# Patient Record
Sex: MALE | Race: WHITE | Employment: PART TIME | URBAN - METROPOLITAN AREA
[De-identification: names, ages, dates, MRNs, and addresses within clinical notes are randomized per-mention and may not be internally consistent; named-entity substitution may affect disease eponyms.]

---

## 2017-01-16 ENCOUNTER — OFFICE VISIT (OUTPATIENT)
Dept: INTERNAL MEDICINE CLINIC | Facility: CLINIC | Age: 57
End: 2017-01-16

## 2017-01-16 VITALS
DIASTOLIC BLOOD PRESSURE: 82 MMHG | SYSTOLIC BLOOD PRESSURE: 136 MMHG | RESPIRATION RATE: 18 BRPM | TEMPERATURE: 98 F | BODY MASS INDEX: 22.69 KG/M2 | WEIGHT: 171.19 LBS | HEIGHT: 73 IN | HEART RATE: 86 BPM

## 2017-01-16 DIAGNOSIS — F25.9 SCHIZOAFFECTIVE DISORDER, UNSPECIFIED TYPE (HCC): ICD-10-CM

## 2017-01-16 DIAGNOSIS — N50.89 LUMP IN SCROTUM: ICD-10-CM

## 2017-01-16 DIAGNOSIS — N32.81 OAB (OVERACTIVE BLADDER): ICD-10-CM

## 2017-01-16 DIAGNOSIS — Z00.00 ENCOUNTER FOR ANNUAL HEALTH EXAMINATION: Primary | ICD-10-CM

## 2017-01-16 PROCEDURE — G0439 PPPS, SUBSEQ VISIT: HCPCS | Performed by: INTERNAL MEDICINE

## 2017-01-16 RX ORDER — QUETIAPINE 400 MG/1
TABLET, FILM COATED ORAL
Refills: 2 | COMMUNITY
Start: 2016-12-13 | End: 2017-02-16

## 2017-01-16 NOTE — PROGRESS NOTES
HPI:   Melvin Hodgkins is a 60year old male who presents for a Medicare Subsequent Annual Wellness visit (Pt already had Initial Annual Wellness). Patient is here requesting the Medicare annual wellness visit. Last seen here in June.   Had some dysuria Results  Component Value Date   WBC 5.5 06/28/2016   HGB 14.5 06/28/2016    06/28/2016        ALLERGIES:   He is allergic to lithium; haldol; haloperidol; and kdc:olanzapine.     CURRENT MEDICATIONS:     Outpatient Prescriptions Marked as Taking for over the telephone:  No I have trouble following   the conversations when two or more people are talking at the same time:    No   I have trouble understanding things on the TV:  No I have to strain to   understand conversations:  No   I have to worry abou JVD   Back:   Symmetric, no curvature, ROM normal, no CVA tenderness   Lungs:   Clear to auscultation bilaterally, respirations unlabored   Chest Wall:  No tenderness or deformity   Heart:  Regular rate and rhythm, S1, S2 normal, no murmur, rub or gallop out.        Mr. Lisa Ruelas does not currently take aspirin. We discussed the risks and benefits of aspirin therapy. Kate Posada is unable to use daily aspirin therapy For the following reasons:    Other: Not good candidate          Diet assessment: fair activities? : No    Memory Problems?: No      Fall/Risk Assessment     Do you have 3 or more medical conditions?: 0-No    Have you fallen in the last 12 months?: 0-No    Do you accidently lose urine?: 0-No    Do you have difficulty seeing?: 0-No    Do you Years due on 01/01/2019 Update Health Maintenance if applicable    Flex Sigmoidoscopy Screen every 10 years No results found for this or any previous visit. No flowsheet data found.      Fecal Occult Blood Annually No results found for: FOB No flowsheet madelyn

## 2017-01-16 NOTE — PATIENT INSTRUCTIONS
Roma Sands's SCREENING SCHEDULE   Tests on this list are recommended by your physician but may not be covered, or covered at this frequency, by your insurer. Please check with your insurance carrier before scheduling to verify coverage.     PREVENTATIV Covered every 5 years No results found for this or any previous visit. No flowsheet data found. Fecal Occult Blood   Covered Annually No results found for: FOB, OCCULTSTOOL No flowsheet data found.      Barium Enema-   uncomfortable but covered  Covere covered with your pharmacy  prescription benefits     Recommended Websites for Advanced Directives    [email or web address] [email or web address]/[email or web address]. pdf  An information packet, including necessary form from the [employer]

## 2017-02-06 ENCOUNTER — HOSPITAL (OUTPATIENT)
Dept: OTHER | Age: 57
End: 2017-02-06
Attending: EMERGENCY MEDICINE

## 2017-02-07 LAB
AMPHETAMINES UR QL SCN>500 NG/ML: NEGATIVE
ANALYZER ANC (IANC): NORMAL
ANION GAP SERPL CALC-SCNC: 11 MMOL/L (ref 10–20)
BARBITURATES UR QL SCN>200 NG/ML: NEGATIVE
BASOPHILS # BLD: 0 THOUSAND/MCL (ref 0–0.3)
BASOPHILS NFR BLD: 0 %
BENZODIAZ UR QL SCN>200 NG/ML: NEGATIVE
BUN SERPL-MCNC: 9 MG/DL (ref 10–20)
BUN/CREAT SERPL: 11 (ref 7–25)
BZE UR QL SCN>150 NG/ML: NEGATIVE
CALCIUM SERPL-MCNC: 9.2 MG/DL (ref 8.4–10.2)
CANNABINOIDS UR QL SCN>50 NG/ML: NEGATIVE
CHLORIDE: 105 MMOL/L (ref 98–107)
CO2 SERPL-SCNC: 29 MMOL/L (ref 21–32)
CREAT SERPL-MCNC: 0.79 MG/DL (ref 0.67–1.17)
DIFFERENTIAL METHOD BLD: NORMAL
EOSINOPHIL # BLD: 0.2 THOUSAND/MCL (ref 0.1–0.5)
EOSINOPHIL NFR BLD: 3 %
ERYTHROCYTE [DISTWIDTH] IN BLOOD: 12.2 % (ref 11–15)
ETHANOL SERPL-MCNC: NORMAL MG/DL
GLUCOSE SERPL-MCNC: 124 MG/DL (ref 65–99)
HEMATOCRIT: 41.3 % (ref 39–51)
HGB BLD-MCNC: 14.6 GM/DL (ref 13–17)
LYMPHOCYTES # BLD: 2.5 THOUSAND/MCL (ref 1–4)
LYMPHOCYTES NFR BLD: 32 %
MCH RBC QN AUTO: 30.4 PG (ref 26–34)
MCHC RBC AUTO-ENTMCNC: 35.4 GM/DL (ref 32–36.5)
MCV RBC AUTO: 86 FL (ref 78–100)
MONOCYTES # BLD: 0.8 THOUSAND/MCL (ref 0.3–0.9)
MONOCYTES NFR BLD: 10 %
NEUTROPHILS # BLD: 4.2 THOUSAND/MCL (ref 1.8–7.7)
NEUTROPHILS NFR BLD: 55 %
NEUTS SEG NFR BLD: NORMAL %
OPIATES UR QL SCN>300 NG/ML: NEGATIVE
PCP UR QL SCN>25 NG/ML: NEGATIVE
PERCENT NRBC: NORMAL
PLATELET # BLD: 292 THOUSAND/MCL (ref 140–450)
POTASSIUM SERPL-SCNC: 3.9 MMOL/L (ref 3.4–5.1)
RBC # BLD: 4.8 MILLION/MCL (ref 4.5–5.9)
SODIUM SERPL-SCNC: 141 MMOL/L (ref 135–145)
WBC # BLD: 7.7 THOUSAND/MCL (ref 4.2–11)

## 2017-02-16 NOTE — PROGRESS NOTES
HPI:    Patient ID: Rowan Stone is a 60year old male. HPI  Patient is here for follow-up on recent emergency room visit. We last saw him here on January 16 for Medicare annual wellness visit. Underlying schizoaffective disorder.   Patient feels he i [Haloperidol*      Haloperidol                 Comment:Other reaction(s): HALOPERIDOL LACTATE             Other reaction(s): HALOPERIDOL  Kdc:Olanzapine            Lithium                   Valproic Acid              PHYSICAL EXAM:   Physical Exam    Const

## 2017-03-30 ENCOUNTER — OFFICE VISIT (OUTPATIENT)
Dept: INTERNAL MEDICINE CLINIC | Facility: CLINIC | Age: 57
End: 2017-03-30

## 2017-03-30 ENCOUNTER — HOSPITAL ENCOUNTER (OUTPATIENT)
Dept: GENERAL RADIOLOGY | Age: 57
Discharge: HOME | End: 2017-03-30
Attending: INTERNAL MEDICINE | Admitting: INTERNAL MEDICINE
Payer: MEDICARE

## 2017-03-30 VITALS
SYSTOLIC BLOOD PRESSURE: 122 MMHG | DIASTOLIC BLOOD PRESSURE: 78 MMHG | WEIGHT: 160 LBS | RESPIRATION RATE: 18 BRPM | HEART RATE: 74 BPM | TEMPERATURE: 98 F | BODY MASS INDEX: 21.2 KG/M2 | HEIGHT: 73 IN

## 2017-03-30 DIAGNOSIS — Z87.828 HISTORY OF TRAUMATIC INJURY OF HEAD: Primary | ICD-10-CM

## 2017-03-30 DIAGNOSIS — Z87.828 HISTORY OF TRAUMATIC INJURY OF HEAD: ICD-10-CM

## 2017-03-30 PROCEDURE — G0463 HOSPITAL OUTPT CLINIC VISIT: HCPCS | Performed by: INTERNAL MEDICINE

## 2017-03-30 PROCEDURE — 70250 X-RAY EXAM OF SKULL: CPT

## 2017-03-30 PROCEDURE — 99213 OFFICE O/P EST LOW 20 MIN: CPT | Performed by: INTERNAL MEDICINE

## 2017-03-30 RX ORDER — LORAZEPAM 1 MG/1
TABLET ORAL
Refills: 1 | COMMUNITY
Start: 2017-03-25 | End: 2018-07-13 | Stop reason: ALTCHOICE

## 2017-03-30 RX ORDER — QUETIAPINE 400 MG/1
TABLET, FILM COATED ORAL
Refills: 2 | COMMUNITY
Start: 2017-02-21 | End: 2017-03-30 | Stop reason: ALTCHOICE

## 2017-03-30 RX ORDER — LURASIDONE HYDROCHLORIDE 20 MG/1
TABLET, FILM COATED ORAL
Refills: 2 | COMMUNITY
Start: 2017-03-25 | End: 2018-03-16 | Stop reason: ALTCHOICE

## 2017-03-30 NOTE — PROGRESS NOTES
HPI:    Patient ID: Melvin Hodgkins is a 59year old male. HPI  Patient is here concerned about possible issues with his skull and brain from previous injuries. He has underlying schizoaffective disorder. Saw him here on February 16.   At that time he walker reaction(s): HALOPERIDOL LACTATE             Other reaction(s): HALOPERIDOL  Kdc:Olanzapine            Lithium                   Valproic Acid              PHYSICAL EXAM:   Physical Exam    Constitutional: He appears well-developed and well-nourished.    HE

## 2017-07-28 ENCOUNTER — HOSPITAL ENCOUNTER (EMERGENCY)
Facility: HOSPITAL | Age: 57
Discharge: HOME | End: 2017-07-28
Attending: EMERGENCY MEDICINE
Payer: MEDICARE

## 2017-07-28 ENCOUNTER — APPOINTMENT (OUTPATIENT)
Dept: GENERAL RADIOLOGY | Facility: HOSPITAL | Age: 57
End: 2017-07-28
Attending: EMERGENCY MEDICINE
Payer: MEDICARE

## 2017-07-28 VITALS
TEMPERATURE: 98 F | RESPIRATION RATE: 16 BRPM | WEIGHT: 160 LBS | HEART RATE: 50 BPM | SYSTOLIC BLOOD PRESSURE: 110 MMHG | OXYGEN SATURATION: 97 % | BODY MASS INDEX: 21.2 KG/M2 | DIASTOLIC BLOOD PRESSURE: 70 MMHG | HEIGHT: 73 IN

## 2017-07-28 DIAGNOSIS — S61.217A LACERATION OF LEFT LITTLE FINGER WITHOUT FOREIGN BODY WITHOUT DAMAGE TO NAIL, INITIAL ENCOUNTER: Primary | ICD-10-CM

## 2017-07-28 PROCEDURE — 73140 X-RAY EXAM OF FINGER(S): CPT | Performed by: EMERGENCY MEDICINE

## 2017-07-28 PROCEDURE — 12002 RPR S/N/AX/GEN/TRNK2.6-7.5CM: CPT

## 2017-07-28 PROCEDURE — 90471 IMMUNIZATION ADMIN: CPT

## 2017-07-28 PROCEDURE — 99283 EMERGENCY DEPT VISIT LOW MDM: CPT

## 2017-07-28 RX ORDER — TETANUS AND DIPHTHERIA TOXOIDS ADSORBED 2; 2 [LF]/.5ML; [LF]/.5ML
INJECTION INTRAMUSCULAR
Status: DISCONTINUED
Start: 2017-07-28 | End: 2017-07-28

## 2017-07-28 NOTE — ED INITIAL ASSESSMENT (HPI)
L 5th finger laceration while using  at work just prior to arrival to ER, bleeding controlled. PETER Zelaya providing wound care.

## 2017-07-28 NOTE — ED PROVIDER NOTES
Patient Seen in: [facility] AND [facility] Emergency Department    History   Patient presents with:  Laceration Abrasion (integumentary)    Stated Complaint: L pinky lac    HPI    HPI: Orlin Pedroza is a 59year old male who presents after an injury to the l 5th distal prox phalynx to prox distal phalynx 5 cm in length, no tendong injury, rom preserved sensation intact  . 2+ distal pulses. NEURO:Sensation to touch is intact. SKIN: No open wounds, no rashes. PSYCH: Normal affect. Calm and cooperative.

## 2017-07-29 ENCOUNTER — TELEPHONE (OUTPATIENT)
Dept: INTERNAL MEDICINE CLINIC | Facility: CLINIC | Age: 57
End: 2017-07-29

## 2017-07-29 NOTE — TELEPHONE ENCOUNTER
Pt states went to ER and got stitches in finger and per the instruction the Pt is to see his PCP in 7 days to get stitches removed. Pt is requesting to be seen 8/3/2017 (this will be the 7th day) to get stitches removed but no open slots.

## 2017-08-02 NOTE — TELEPHONE ENCOUNTER
Okay to place at 1:00 or 130 this Friday afternoon. Otherwise he can be placed in a same day sick next Monday.

## 2017-08-02 NOTE — TELEPHONE ENCOUNTER
Pt is calling back state that he would like to wait two more days to make sure it heal   Requesting a different date   Dr please advise

## 2017-08-07 ENCOUNTER — OFFICE VISIT (OUTPATIENT)
Dept: INTERNAL MEDICINE CLINIC | Facility: CLINIC | Age: 57
End: 2017-08-07

## 2017-08-07 VITALS
BODY MASS INDEX: 21 KG/M2 | HEART RATE: 60 BPM | TEMPERATURE: 98 F | DIASTOLIC BLOOD PRESSURE: 64 MMHG | WEIGHT: 159 LBS | SYSTOLIC BLOOD PRESSURE: 109 MMHG

## 2017-08-07 DIAGNOSIS — S61.211S LACERATION OF LEFT INDEX FINGER WITHOUT FOREIGN BODY WITHOUT DAMAGE TO NAIL, SEQUELA: Primary | ICD-10-CM

## 2017-08-07 PROCEDURE — G0463 HOSPITAL OUTPT CLINIC VISIT: HCPCS | Performed by: INTERNAL MEDICINE

## 2017-08-07 PROCEDURE — 99212 OFFICE O/P EST SF 10 MIN: CPT | Performed by: INTERNAL MEDICINE

## 2017-08-08 NOTE — PROGRESS NOTES
HPI:    Patient ID: Merrill Hernandes is a 59year old male.     HPI  10 days ago suffered a laceration of the left index finger, 8 sutures were placed in the emergency room, he reports no pain, states that he works with his hands, and was protecting laceration encounter diagnosis)  Tolerated removal of sutures well, Steri-Strips applied, protect area from injury for another few days, follow-up as needed  No orders of the defined types were placed in this encounter.       Meds This Visit:  No prescriptions request

## 2018-03-07 ENCOUNTER — TELEPHONE (OUTPATIENT)
Dept: INTERNAL MEDICINE CLINIC | Facility: CLINIC | Age: 58
End: 2018-03-07

## 2018-03-07 NOTE — TELEPHONE ENCOUNTER
The issues that would limit the patient's safety and driving have to do with his underlying psychiatric conditions and the medications that he is taking. Therefore, any form would need to be completed by a psychiatrist in regards to clearance for driving.

## 2018-03-07 NOTE — TELEPHONE ENCOUNTER
Please note reason for call and advise,  drivers service Dept. Medical report form placed on JSK desk for review and signature.

## 2018-03-07 NOTE — TELEPHONE ENCOUNTER
Pt dropped off a Sec of state from that needs to be completed ASAP. Pt would like to be called when form is done.  Form was put in Dr Smitha Freeman box at [facility]

## 2018-03-09 NOTE — TELEPHONE ENCOUNTER
LEFT MESSAGE FOR PATIENT TO CALL OFFICE BACK, TRANSFER CALL TO EXT. [facility] PLEASE NOTE PHYSICIAN NOTE CONCERNING FORM. FORM PLACED AT  [facility] 2ND FLOOR.

## 2018-03-14 NOTE — TELEPHONE ENCOUNTER
LMTCB please let pt know his appt with Mari Harvey has been cancelled for 3-16-18, Pt presented to [facility] with a form he needs filled out ASAP and wanted to make appt, appt with Mari Harvey was booked, but then cancelled when this encounter from last week was read, p

## 2018-03-14 NOTE — TELEPHONE ENCOUNTER
Spoke to pt, he is aware his appt with Rufina Dozierr is cancelled for 3-16-18, he is awaiting a nurse call regarding why his form cannot be filled out

## 2018-03-14 NOTE — TELEPHONE ENCOUNTER
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  Patient informed of Dr Stock He message (view below), he states he seen the psychiatrist and he filled out the mental health sect

## 2018-03-16 ENCOUNTER — OFFICE VISIT (OUTPATIENT)
Dept: INTERNAL MEDICINE CLINIC | Facility: CLINIC | Age: 58
End: 2018-03-16

## 2018-03-16 VITALS
DIASTOLIC BLOOD PRESSURE: 69 MMHG | BODY MASS INDEX: 23.06 KG/M2 | HEIGHT: 73 IN | HEART RATE: 67 BPM | SYSTOLIC BLOOD PRESSURE: 121 MMHG | WEIGHT: 174 LBS

## 2018-03-16 DIAGNOSIS — Z02.89 ENCOUNTER FOR COMPLETION OF FORM WITH PATIENT: Primary | ICD-10-CM

## 2018-03-16 PROCEDURE — G0463 HOSPITAL OUTPT CLINIC VISIT: HCPCS | Performed by: INTERNAL MEDICINE

## 2018-03-16 PROCEDURE — 99213 OFFICE O/P EST LOW 20 MIN: CPT | Performed by: INTERNAL MEDICINE

## 2018-03-16 RX ORDER — QUETIAPINE 300 MG/1
TABLET, FILM COATED ORAL
Refills: 2 | COMMUNITY
Start: 2018-03-02

## 2018-03-16 NOTE — PROGRESS NOTES
Marie Saba is a 59year old male. Patient presents with:  Complete Form    HPI:   Mr. Alesia Hartley presents this morning requesting that a form from the  be completed.   He says he needs this form completed every 4-5 years documenting that it distress  /69 (BP Location: Left arm, Patient Position: Sitting, Cuff Size: large)   Pulse 67   Ht 6' 1\" (1.854 m)   Wt 174 lb (78.9 kg)   BMI 22.96 kg/m²   Exam deferred      ASSESSMENT AND PLAN:   1.  Encounter for completion of form with patient

## 2018-06-22 ENCOUNTER — HOSPITAL ENCOUNTER (EMERGENCY)
Facility: HOSPITAL | Age: 58
Discharge: HOME | End: 2018-06-22
Attending: EMERGENCY MEDICINE
Payer: MEDICARE

## 2018-06-22 ENCOUNTER — TELEPHONE (OUTPATIENT)
Dept: GASTROENTEROLOGY | Facility: CLINIC | Age: 58
End: 2018-06-22

## 2018-06-22 ENCOUNTER — APPOINTMENT (OUTPATIENT)
Dept: CT IMAGING | Facility: HOSPITAL | Age: 58
End: 2018-06-22
Attending: EMERGENCY MEDICINE
Payer: MEDICARE

## 2018-06-22 VITALS
HEART RATE: 54 BPM | WEIGHT: 160 LBS | DIASTOLIC BLOOD PRESSURE: 74 MMHG | TEMPERATURE: 98 F | BODY MASS INDEX: 21.2 KG/M2 | HEIGHT: 73 IN | SYSTOLIC BLOOD PRESSURE: 119 MMHG | RESPIRATION RATE: 18 BRPM | OXYGEN SATURATION: 98 %

## 2018-06-22 DIAGNOSIS — R53.81 MALAISE: ICD-10-CM

## 2018-06-22 DIAGNOSIS — R10.9 ABDOMINAL PAIN, ACUTE: Primary | ICD-10-CM

## 2018-06-22 PROCEDURE — 85025 COMPLETE CBC W/AUTO DIFF WBC: CPT | Performed by: EMERGENCY MEDICINE

## 2018-06-22 PROCEDURE — 82962 GLUCOSE BLOOD TEST: CPT

## 2018-06-22 PROCEDURE — 93005 ELECTROCARDIOGRAM TRACING: CPT

## 2018-06-22 PROCEDURE — 93010 ELECTROCARDIOGRAM REPORT: CPT | Performed by: EMERGENCY MEDICINE

## 2018-06-22 PROCEDURE — 80048 BASIC METABOLIC PNL TOTAL CA: CPT | Performed by: EMERGENCY MEDICINE

## 2018-06-22 PROCEDURE — 74177 CT ABD & PELVIS W/CONTRAST: CPT | Performed by: EMERGENCY MEDICINE

## 2018-06-22 PROCEDURE — 36415 COLL VENOUS BLD VENIPUNCTURE: CPT

## 2018-06-22 PROCEDURE — 99285 EMERGENCY DEPT VISIT HI MDM: CPT

## 2018-06-22 RX ORDER — METRONIDAZOLE 500 MG/1
500 TABLET ORAL 3 TIMES DAILY
Qty: 21 TABLET | Refills: 0 | Status: SHIPPED | OUTPATIENT
Start: 2018-06-22 | End: 2018-06-29

## 2018-06-22 RX ORDER — LEVOFLOXACIN 500 MG/1
500 TABLET, FILM COATED ORAL DAILY
Qty: 7 TABLET | Refills: 0 | Status: SHIPPED | OUTPATIENT
Start: 2018-06-22 | End: 2018-06-29

## 2018-06-22 NOTE — TELEPHONE ENCOUNTER
D/w ER--->patient with some mild ab pain; no fever or leukocytosis. CT showing ileitis, infectious vs Crohn's. He is being discharged on oral abx and some pain meds. GI Clinical Staff:   Please call patient, I would like to see him in clinic for f/u.  Do

## 2018-06-22 NOTE — TELEPHONE ENCOUNTER
LMTCB    CSS-please offer appt with Dr. Nigel Reed at [facility] on 6/29/18 @ 2:30pm, arrival 2:15pm AND re-route to RN's to add the pt to the provider schedule, thank you.

## 2018-06-22 NOTE — ED PROVIDER NOTES
Patient Seen in: [facility] AND [facility] Emergency Department    History   Patient presents with:  Fatigue (constitutional, neurologic)    Stated Complaint: possible hyperglycemia    HPI    Patient presents to the emergency department with complaint of multipl Review of Systems  Constitutional: no fever, feels like his energy is diminished he feels like he is losing weight  HEENT: No cough, no congestion  Cardiovascular: no chest pain  Respiratory: no shortness of breath  Gastrointestinal: abdominal pain, some inflammation. Vital signs are reassuring he has no known intestinal issues. I have paged gastroenterologist to discuss. Patient workup shows regional enteritis. I discussed this with him I did discuss with gastroenterologist Dr. Hayden Tapia.   He did r obtain basic health screening including reassessment of your blood pressure.       Clinical Impression:  Abdominal pain, acute  (primary encounter diagnosis)  Malaise    Disposition:  Discharge    Follow-up:  Tasha Feliz MD  [address]

## 2018-06-22 NOTE — ED INITIAL ASSESSMENT (HPI)
Pt  States he feels like he's going to faint and has had a decreased appetite intermittently [id number] with LLQ pain. Pt denies CP, sob, or fevers.

## 2018-06-28 NOTE — TELEPHONE ENCOUNTER
3rd attempt to reach pt regarding Dr. Maria Alejandra Mckeon message below. LMTCB, no answer. No response letter generated and mailed to pt home address today.

## 2018-07-13 ENCOUNTER — OFFICE VISIT (OUTPATIENT)
Dept: FAMILY MEDICINE CLINIC | Facility: CLINIC | Age: 58
End: 2018-07-13

## 2018-07-13 ENCOUNTER — APPOINTMENT (OUTPATIENT)
Dept: LAB | Age: 58
End: 2018-07-13
Attending: FAMILY MEDICINE
Payer: MEDICARE

## 2018-07-13 VITALS
WEIGHT: 156.88 LBS | TEMPERATURE: 99 F | HEIGHT: 73 IN | BODY MASS INDEX: 20.79 KG/M2 | DIASTOLIC BLOOD PRESSURE: 68 MMHG | SYSTOLIC BLOOD PRESSURE: 109 MMHG | HEART RATE: 64 BPM

## 2018-07-13 DIAGNOSIS — R91.1 PULMONARY NODULE: ICD-10-CM

## 2018-07-13 DIAGNOSIS — K52.9 COLITIS: Primary | ICD-10-CM

## 2018-07-13 DIAGNOSIS — R63.4 WEIGHT LOSS: ICD-10-CM

## 2018-07-13 LAB — TSH SERPL-ACNC: 1.05 UIU/ML (ref 0.45–5.33)

## 2018-07-13 PROCEDURE — G0463 HOSPITAL OUTPT CLINIC VISIT: HCPCS | Performed by: FAMILY MEDICINE

## 2018-07-13 PROCEDURE — 84443 ASSAY THYROID STIM HORMONE: CPT

## 2018-07-13 PROCEDURE — 99213 OFFICE O/P EST LOW 20 MIN: CPT | Performed by: FAMILY MEDICINE

## 2018-07-13 PROCEDURE — 36415 COLL VENOUS BLD VENIPUNCTURE: CPT

## 2018-07-13 RX ORDER — QUETIAPINE 200 MG/1
TABLET, FILM COATED ORAL
Refills: 2 | COMMUNITY
Start: 2018-07-03

## 2018-07-13 RX ORDER — LEVOFLOXACIN 500 MG/1
TABLET, FILM COATED ORAL
Refills: 0 | COMMUNITY
Start: 2018-06-22 | End: 2018-07-13 | Stop reason: ALTCHOICE

## 2018-07-13 RX ORDER — METRONIDAZOLE 500 MG/1
500 TABLET ORAL 3 TIMES DAILY
Refills: 0 | COMMUNITY
Start: 2018-06-22 | End: 2018-07-13 | Stop reason: ALTCHOICE

## 2018-07-13 NOTE — PROGRESS NOTES
Blood pressure 109/68, pulse 64, temperature 98.6 °F (37 °C), temperature source Oral, height 6' 1\" (1.854 m), weight 156 lb 14.4 oz (71.2 kg).     Patient presents today following up for an emergency department visit where he was found to have colitis and

## 2018-07-26 ENCOUNTER — HOSPITAL ENCOUNTER (OUTPATIENT)
Dept: CT IMAGING | Facility: HOSPITAL | Age: 58
Discharge: HOME | End: 2018-07-26
Attending: FAMILY MEDICINE
Payer: MEDICARE

## 2018-07-26 DIAGNOSIS — R91.1 PULMONARY NODULE: ICD-10-CM

## 2018-07-26 PROCEDURE — 71250 CT THORAX DX C-: CPT | Performed by: FAMILY MEDICINE

## 2018-08-02 ENCOUNTER — TELEPHONE (OUTPATIENT)
Dept: GASTROENTEROLOGY | Facility: CLINIC | Age: 58
End: 2018-08-02

## 2018-08-02 NOTE — TELEPHONE ENCOUNTER
Pt had consult today came to [facility] in error- resched per MD- per Eli Gan- RN to call pt to reschedule before next available-pl call pt

## 2018-08-02 NOTE — TELEPHONE ENCOUNTER
Discussed w/ BILL and OK to add pt on 8/7/18 @ 11:45AM, arrival of 11:30AM at the [address] [place] [address]. LMTCB- CSS- please confirm appt and re-route to RN's to add pt to provider schedule, thanks.

## 2018-08-02 NOTE — TELEPHONE ENCOUNTER
RICHAR - Pt ret'd call. He needs to check with his employer about this appt. He will call back tomorrow before noon to confirm appt. He did not want CSS to add appt to schedule until he calls back.

## 2018-08-02 NOTE — TELEPHONE ENCOUNTER
Checo Grayson MD routed conversation to [facility] Clinical Staff 23 minutes ago (1:07 PM)      Checo Grayson MD 24 minutes ago (1:06 PM)         See if patient can come at 11:45 on Tuesday August 7th.     Thanks     Jessie Zaidi MD  [facility]

## 2018-08-06 NOTE — TELEPHONE ENCOUNTER
LMTCB-    CSS-does pt want the appt for 8/7/18 @ 11:45AM (arrive at 11:30AM) w/  [address] [id number] [place] [facility]?

## 2018-08-07 NOTE — TELEPHONE ENCOUNTER
Pt states he can NOT make the [phone] appointment because he works at noon attempt to transfer to RN with NO ANSWER please call thank you

## 2018-08-08 NOTE — TELEPHONE ENCOUNTER
Dr Abril Suárez,    Pt can only come in the morning. You have a very busy schedule tomorrow, but would you like to add at 9 am. Pt works 12-5.     Please advise

## 2018-08-08 NOTE — TELEPHONE ENCOUNTER
I can see him at 9 AM on Tuesday 8/14    Thanks    Jessie Zaidi MD  [address] - Gastroenterology  8/8/2018  10:44 AM

## 2018-08-08 NOTE — TELEPHONE ENCOUNTER
I added the pt to Dr Saima Levin schedule.     Future Appointments  Date Time Provider Yuval Coles   8/14/2018 9:00 AM Florence Staples MD [facility] [place]   8/15/2018 10:00 AM Darrius Workman DO [facility] [place]     CSS/NANCY:    If pt c

## 2018-08-09 NOTE — TELEPHONE ENCOUNTER
ODILIA    CSS- please confirm pt appt for 8/14/18 @ 9AM w/ Dr. Ivon Andrews (arrival time of 8:45AM), thank you.

## 2018-08-10 NOTE — TELEPHONE ENCOUNTER
Noted, thank you Zana Brannon.   Future Appointments  Date Time Provider Yuval Coles   8/14/2018 9:00 AM Ariane Medrano MD [facility]

## 2018-08-10 NOTE — TELEPHONE ENCOUNTER
FYI - Pt returned call and accepted appt for 8/14/18 at [address] [place] [address] w/ arrival at 8:45 at [facility].

## 2018-08-14 ENCOUNTER — OFFICE VISIT (OUTPATIENT)
Dept: GASTROENTEROLOGY | Facility: CLINIC | Age: 58
End: 2018-08-14
Payer: MEDICARE

## 2018-08-14 ENCOUNTER — APPOINTMENT (OUTPATIENT)
Dept: LAB | Facility: HOSPITAL | Age: 58
End: 2018-08-14
Attending: INTERNAL MEDICINE
Payer: MEDICARE

## 2018-08-14 VITALS
DIASTOLIC BLOOD PRESSURE: 66 MMHG | HEIGHT: 72 IN | HEART RATE: 80 BPM | SYSTOLIC BLOOD PRESSURE: 101 MMHG | WEIGHT: 153 LBS | BODY MASS INDEX: 20.72 KG/M2

## 2018-08-14 DIAGNOSIS — R10.32 LEFT LOWER QUADRANT PAIN: ICD-10-CM

## 2018-08-14 DIAGNOSIS — R93.5 ABNORMAL CT OF THE ABDOMEN: ICD-10-CM

## 2018-08-14 DIAGNOSIS — R19.7 DIARRHEA, UNSPECIFIED TYPE: ICD-10-CM

## 2018-08-14 DIAGNOSIS — R19.7 DIARRHEA, UNSPECIFIED TYPE: Primary | ICD-10-CM

## 2018-08-14 LAB
ALBUMIN SERPL BCP-MCNC: 4.3 G/DL (ref 3.5–4.8)
ALBUMIN/GLOB SERPL: 1.6 {RATIO} (ref 1–2)
ALP SERPL-CCNC: 74 U/L (ref 32–100)
ALT SERPL-CCNC: 21 U/L (ref 17–63)
ANION GAP SERPL CALC-SCNC: 8 MMOL/L (ref 0–18)
AST SERPL-CCNC: 22 U/L (ref 15–41)
BILIRUB SERPL-MCNC: 0.4 MG/DL (ref 0.3–1.2)
BUN SERPL-MCNC: 11 MG/DL (ref 8–20)
BUN/CREAT SERPL: 11.1 (ref 10–20)
CALCIUM SERPL-MCNC: 9.4 MG/DL (ref 8.5–10.5)
CHLORIDE SERPL-SCNC: 103 MMOL/L (ref 95–110)
CO2 SERPL-SCNC: 25 MMOL/L (ref 22–32)
CREAT SERPL-MCNC: 0.99 MG/DL (ref 0.5–1.5)
CRP SERPL-MCNC: 0.5 MG/DL (ref 0–0.9)
GLOBULIN PLAS-MCNC: 2.7 G/DL (ref 2.5–3.7)
GLUCOSE SERPL-MCNC: 107 MG/DL (ref 70–99)
OSMOLALITY UR CALC.SUM OF ELEC: 282 MOSM/KG (ref 275–295)
PATIENT FASTING: YES
POTASSIUM SERPL-SCNC: 4.3 MMOL/L (ref 3.3–5.1)
PROT SERPL-MCNC: 7 G/DL (ref 5.9–8.4)
SODIUM SERPL-SCNC: 136 MMOL/L (ref 136–144)
VIT B12 SERPL-MCNC: 424 PG/ML (ref 181–914)

## 2018-08-14 PROCEDURE — 36415 COLL VENOUS BLD VENIPUNCTURE: CPT

## 2018-08-14 PROCEDURE — 83516 IMMUNOASSAY NONANTIBODY: CPT

## 2018-08-14 PROCEDURE — 82607 VITAMIN B-12: CPT

## 2018-08-14 PROCEDURE — 80053 COMPREHEN METABOLIC PANEL: CPT

## 2018-08-14 PROCEDURE — 99204 OFFICE O/P NEW MOD 45 MIN: CPT | Performed by: INTERNAL MEDICINE

## 2018-08-14 PROCEDURE — 86140 C-REACTIVE PROTEIN: CPT

## 2018-08-14 PROCEDURE — G0463 HOSPITAL OUTPT CLINIC VISIT: HCPCS | Performed by: INTERNAL MEDICINE

## 2018-08-14 NOTE — H&P
[facility], [facility] - Gastroenterology                                                                                                  Clinic History and Physical Oral Tab TAKE 1 TABLET BY MOUTH EVERY EVENING (TOTAL OF 500MG PER DAY) Disp:  Rfl: 2   QUEtiapine Fumarate 300 MG Oral Tab one tablet in the morning Disp:  Rfl: 2   Zolpidem Tartrate 10 MG Oral Tab TK 1 T PO HS PRN Disp:  Rfl: 1     Allergies:    Lithium had extreme trouble describing characteristics of the symptoms of pain and diarrhea, which as I discussed with him determining what is going on is extremely difficult.      Objectively, there is a suggestion of terminal ileal abnormality showing 2 segments

## 2018-08-14 NOTE — PATIENT INSTRUCTIONS
1. Get your blood tests    2. Get your stool tests    3.  Return to clinic in 3-4 weeks to discuss colonoscopy and discuss your tests above

## 2018-08-15 ENCOUNTER — LAB ENCOUNTER (OUTPATIENT)
Dept: LAB | Facility: HOSPITAL | Age: 58
End: 2018-08-15
Attending: INTERNAL MEDICINE
Payer: MEDICARE

## 2018-08-15 ENCOUNTER — OFFICE VISIT (OUTPATIENT)
Dept: FAMILY MEDICINE CLINIC | Facility: CLINIC | Age: 58
End: 2018-08-15
Payer: MEDICARE

## 2018-08-15 VITALS
HEIGHT: 73 IN | HEART RATE: 64 BPM | DIASTOLIC BLOOD PRESSURE: 69 MMHG | RESPIRATION RATE: 12 BRPM | SYSTOLIC BLOOD PRESSURE: 107 MMHG | TEMPERATURE: 99 F | BODY MASS INDEX: 20.28 KG/M2 | WEIGHT: 153 LBS

## 2018-08-15 DIAGNOSIS — F25.8 OTHER SCHIZOAFFECTIVE DISORDERS (HCC): ICD-10-CM

## 2018-08-15 DIAGNOSIS — F10.20 ALCOHOLISM (HCC): ICD-10-CM

## 2018-08-15 DIAGNOSIS — R91.1 PULMONARY NODULE: ICD-10-CM

## 2018-08-15 DIAGNOSIS — Z00.00 MEDICARE ANNUAL WELLNESS VISIT, INITIAL: Primary | ICD-10-CM

## 2018-08-15 DIAGNOSIS — Z00.00 ENCOUNTER FOR ANNUAL HEALTH EXAMINATION: ICD-10-CM

## 2018-08-15 DIAGNOSIS — R10.32 LEFT LOWER QUADRANT PAIN: ICD-10-CM

## 2018-08-15 DIAGNOSIS — R19.7 DIARRHEA, UNSPECIFIED TYPE: ICD-10-CM

## 2018-08-15 DIAGNOSIS — R93.5 ABNORMAL CT OF THE ABDOMEN: ICD-10-CM

## 2018-08-15 DIAGNOSIS — N50.89 TESTICULAR LUMP: ICD-10-CM

## 2018-08-15 LAB — TTG IGA SER-ACNC: 0.3 U/ML (ref ?–7)

## 2018-08-15 PROCEDURE — G0439 PPPS, SUBSEQ VISIT: HCPCS | Performed by: FAMILY MEDICINE

## 2018-08-15 PROCEDURE — 87427 SHIGA-LIKE TOXIN AG IA: CPT

## 2018-08-15 PROCEDURE — 87015 SPECIMEN INFECT AGNT CONCNTJ: CPT

## 2018-08-15 PROCEDURE — 87272 CRYPTOSPORIDIUM AG IF: CPT

## 2018-08-15 PROCEDURE — 87493 C DIFF AMPLIFIED PROBE: CPT

## 2018-08-15 PROCEDURE — 87329 GIARDIA AG IA: CPT

## 2018-08-15 PROCEDURE — 87046 STOOL CULTR AEROBIC BACT EA: CPT

## 2018-08-15 PROCEDURE — 87207 SMEAR SPECIAL STAIN: CPT

## 2018-08-15 PROCEDURE — 87045 FECES CULTURE AEROBIC BACT: CPT

## 2018-08-15 NOTE — PATIENT INSTRUCTIONS
Fall Prevention  Falls often occur due to slipping, tripping or losing your balance. Millions of people fall every year and injure themselves. Here are ways to reduce your risk of falling again.   · Think about your fall, was there anything that caused yo · Use night lights. · Go over all your medicines with a pharmacist or other healthcare provider to see if any of them could make you more likely to fall. Date Last Reviewed: 4/1/2018  © 7724-1896 [employer].  Alter Wall [address] [facility] Abdominal aortic aneurysm screening (once between ages 73-68)  No results found for this or any previous visit.  Limited to patients who meet one of the following criteria:   • Men who are 73-68 years old and have smoked more than 100 cigarettes in their l Clients of institutions for the mentally retarded   Persons who live in the same house as a HepB virus carrier   Homosexual men   Illicit injectable drug abusers     Tetanus Toxoid- Only covered with a cut with metal- TD and TDaP Not covered by Medicare [place]

## 2018-08-15 NOTE — PROGRESS NOTES
HPI:   Delma Rivero is a 59year old male who presents for a Medicare Initial Annual Wellness visit (Once after 12 month Medicare anniversary) .       His last annual assessment has been over 1 year: Annual Physical due on 01/16/2018         Fall/Risk Asse better off dead, or of hurting yourself in some way: Not at all  PHQ-9 TOTAL SCORE: 6  If you checked off any problems, how difficult have these problems made it for you to do your work, take care of things at home, or get along with other people?: Aaron Antonio Results  Component Value Date   AST 22 08/14/2018   ALT 21 08/14/2018   CA 9.4 08/14/2018   ALB 4.3 08/14/2018   TSH 1.05 07/13/2018   CREATSERUM 0.99 08/14/2018    (H) 08/14/2018        CBC  (most recent labs)     Lab Results  Component Value Date Resp 12   Ht 6' 1\" (1.854 m)   Wt 153 lb (69.4 kg)   BMI 20.19 kg/m²   Estimated body mass index is 20.19 kg/m² as calculated from the following:    Height as of this encounter: 6' 1\" (1.854 m). Weight as of this encounter: 153 lb (69.4 kg).     Medica Normal TM's and external ear canals, both ears   Nose: Nares normal, septum midline, mucosa normal, no drainage or sinus tenderness   Throat: Lips, mucosa, and tongue normal; teeth and gums normal   Neck: Supple, symmetrical, trachea midline, no adenopathy pounds without trying?: 2 - No  Has your appetite been poor?: No  How does the patient maintain a good energy level?: Appropriate Exercise  How would you describe your daily physical activity?: Moderate  How would you describe your current health state?: G vaccine history found Medium/high risk factors:   End-stage renal disease   Hemophiliacs who received Factor VIII or IX concentrates   Clients of institutions for the mentally retarded   Persons who live in the same house as a HepB virus carrier   Homosexu

## 2018-08-16 LAB — C DIFF TOX B STL QL: NEGATIVE

## 2018-08-17 LAB
CRYPTOSP AG STL QL IA: NEGATIVE
CYCLOSPORA STAIN: NEGATIVE
G LAMBLIA AG STL QL IA: NEGATIVE

## 2018-09-18 ENCOUNTER — TELEPHONE (OUTPATIENT)
Dept: GASTROENTEROLOGY | Facility: CLINIC | Age: 58
End: 2018-09-18

## 2018-09-18 NOTE — TELEPHONE ENCOUNTER
Pt had appt today at 930- arrived at 10:00- pt st he was at another appt in [place] before- pt asking to be seen sooner than 1st avail -pl call pt

## 2018-09-18 NOTE — TELEPHONE ENCOUNTER
Please see message below. Next available appointment not till end of Oct. Patient asking for sooner date. Please advise if possible to add patient on. Thank you.

## 2018-09-25 NOTE — TELEPHONE ENCOUNTER
I see an opening on Thursday.  Please schedule the patient then and please remind him he needs to arrive on-time    Thanks    Chema Linares MD  [facility] [place] - Gastroenterology  9/25/2018  4:14 PM

## 2018-09-25 NOTE — TELEPHONE ENCOUNTER
Left detailed message for pt to confirm the following appt with Dr. Eloy Berger, emphasized that he is to arrive 15 mins earlier at the [facility] site.     CSS- please confirm the following appt with Dr. Eloy Berger on [place], 9/27/18 @ 5PM, ARRIVAL TIME OF 4:45PM, at the [facility] sit

## 2018-09-26 NOTE — TELEPHONE ENCOUNTER
RICHAR - Pt returned call and confirmed appt on 9/27/18 at [facility] office with arrival time of 4:45pm.

## 2018-09-27 ENCOUNTER — TELEPHONE (OUTPATIENT)
Dept: GASTROENTEROLOGY | Facility: CLINIC | Age: 58
End: 2018-09-27

## 2018-09-27 ENCOUNTER — OFFICE VISIT (OUTPATIENT)
Dept: GASTROENTEROLOGY | Facility: CLINIC | Age: 58
End: 2018-09-27
Payer: MEDICARE

## 2018-09-27 VITALS
HEART RATE: 62 BPM | WEIGHT: 144 LBS | DIASTOLIC BLOOD PRESSURE: 69 MMHG | HEIGHT: 73 IN | BODY MASS INDEX: 19.09 KG/M2 | SYSTOLIC BLOOD PRESSURE: 117 MMHG

## 2018-09-27 DIAGNOSIS — R93.5 ABNORMAL CT OF THE ABDOMEN: Primary | ICD-10-CM

## 2018-09-27 DIAGNOSIS — R63.4 WEIGHT LOSS: ICD-10-CM

## 2018-09-27 PROCEDURE — 99214 OFFICE O/P EST MOD 30 MIN: CPT | Performed by: INTERNAL MEDICINE

## 2018-09-27 RX ORDER — POLYETHYLENE GLYCOL 3350, SODIUM CHLORIDE, SODIUM BICARBONATE, POTASSIUM CHLORIDE 420; 11.2; 5.72; 1.48 G/4L; G/4L; G/4L; G/4L
POWDER, FOR SOLUTION ORAL
Qty: 1 BOTTLE | Refills: 0 | Status: ON HOLD | OUTPATIENT
Start: 2018-09-27 | End: 2018-11-19

## 2018-09-27 NOTE — TELEPHONE ENCOUNTER
Scheduled for:  Colonoscopy 71772   Provider Name: Dr. Peng Norris  Date:  12/11/18  Location:  [facility]  Sedation:  MAC  Time:  1:00 pm, arrival 12:00 pm  Prep: Trilyte  Meds/Allergies Reconciled?:  Physician reviewed  Diagnosis with codes:  Abnormal CT of abdomen R93.

## 2018-09-27 NOTE — PROGRESS NOTES
[facility], [facility] - Gastroenterology                                                                                                  Clinic Progress Note    Patient pr EVENING (TOTAL OF 500MG PER DAY) Disp:  Rfl: 2   QUEtiapine Fumarate 300 MG Oral Tab one tablet in the morning Disp:  Rfl: 2   Zolpidem Tartrate 10 MG Oral Tab TK 1 T PO HS PRN Disp:  Rfl: 1     Allergies:    Lithium                 DIZZINESS  Haldol [Halo serology, CBC, vitamin B12 and CRP have been unremarkable.     Discussed there this is the possibility of IBD based on his CT and that given his weight loss and the CT and that it has been many years since a colonoscopy a colonoscopy is recommended at this

## 2018-09-27 NOTE — PATIENT INSTRUCTIONS
1. Schedule colonoscopy with monitored anesthesia care (MAC) with Dr. Griselda So    2.  bowel prep from pharmacy ([facility] )    3. Continue all medications for procedure    4. Read all bowel prep instructions carefully    5.  AVOID seeds, nuts, p

## 2018-10-05 ENCOUNTER — TELEPHONE (OUTPATIENT)
Dept: GASTROENTEROLOGY | Facility: CLINIC | Age: 58
End: 2018-10-05

## 2018-10-05 ENCOUNTER — OFFICE VISIT (OUTPATIENT)
Dept: FAMILY MEDICINE CLINIC | Facility: CLINIC | Age: 58
End: 2018-10-05
Payer: MEDICARE

## 2018-10-05 VITALS
WEIGHT: 143 LBS | SYSTOLIC BLOOD PRESSURE: 118 MMHG | BODY MASS INDEX: 18.95 KG/M2 | HEIGHT: 73 IN | DIASTOLIC BLOOD PRESSURE: 76 MMHG | HEART RATE: 80 BPM

## 2018-10-05 DIAGNOSIS — R63.4 WEIGHT LOSS: Primary | ICD-10-CM

## 2018-10-05 DIAGNOSIS — R93.5 ABNORMAL CT OF THE ABDOMEN: Primary | ICD-10-CM

## 2018-10-05 DIAGNOSIS — R63.4 WEIGHT LOSS: ICD-10-CM

## 2018-10-05 PROCEDURE — G0463 HOSPITAL OUTPT CLINIC VISIT: HCPCS | Performed by: FAMILY MEDICINE

## 2018-10-05 PROCEDURE — 99213 OFFICE O/P EST LOW 20 MIN: CPT | Performed by: FAMILY MEDICINE

## 2018-10-05 NOTE — TELEPHONE ENCOUNTER
Left detailed message on pt identifiable VM that his procedure will be rescheduled by a GI  within 3-5 business days and to purchase OTC boost/ensure nutritional supplement at preferred store and take once daily.      Routed to GI schedulers- [place]

## 2018-10-05 NOTE — PROGRESS NOTES
Blood pressure 118/76, pulse 80, height 6' 1\" (1.854 m), weight 143 lb (64.9 kg). Patient presents today following up for weight loss. He does have some mild abdominal and right lower back discomfort.   He has been exercising doing cardiac workouts for

## 2018-10-05 NOTE — TELEPHONE ENCOUNTER
Talked to Dr. Candi Webb. Patient loosing weight and his colonoscopy is planned for December. GI Staff:    Please call him to reschedule his colonoscopy for sooner.  Should be within the next 1 month and also please instruct him to start boost/ensure to

## 2018-10-05 NOTE — TELEPHONE ENCOUNTER
Veronika from dr Elysia Vo office requested to send message for dr Denzel Gaston to give dr Elysia Vo a call .  Please call thank you   [phone]

## 2018-10-18 NOTE — TELEPHONE ENCOUNTER
Please schedule him at 11:30 that is fine.     Thank you    Cony Coulter MD  [facility], [facility] - Gastroenterology  10/18/2018  11:03 AM

## 2018-10-18 NOTE — TELEPHONE ENCOUNTER
Dr. Trev Castaneda--    I spoke with this patient since he wanted to schedule his procedure earlier than 12/11/18, I have a couple of question:    Is this an urgent case?     I do have an availability of 11/19/18 at 1130 but it's only 30 min and I would have to

## 2018-10-22 NOTE — TELEPHONE ENCOUNTER
Rescheduled for:  Colonoscopy 84341   Provider Name: Dr. Vasiliy Hicks  Date:     From-12/11/18 To-11/19/18--okay per Dr. Vasiliy Hicks  Location:  [address] [place] [address]  Sedation:  MAC  Time:     From-1300   To-1130 (pt is aware to arrive at 1030)   Prep: Quita, chantal new instr

## 2018-11-13 ENCOUNTER — TELEPHONE (OUTPATIENT)
Dept: GASTROENTEROLOGY | Facility: CLINIC | Age: 58
End: 2018-11-13

## 2018-11-13 NOTE — TELEPHONE ENCOUNTER
Pt contacted and asked how I could help him. He stated, \" you called me, you tell me. \"    I reviewed his message about questions regarding his procedure and states he would like to know how long the procedure is so he can arrange his transportation.  I re

## 2018-11-19 ENCOUNTER — ANESTHESIA (OUTPATIENT)
Dept: ENDOSCOPY | Facility: HOSPITAL | Age: 58
End: 2018-11-19
Payer: MEDICARE

## 2018-11-19 ENCOUNTER — ANESTHESIA EVENT (OUTPATIENT)
Dept: ENDOSCOPY | Facility: HOSPITAL | Age: 58
End: 2018-11-19
Payer: MEDICARE

## 2018-11-19 ENCOUNTER — HOSPITAL ENCOUNTER (OUTPATIENT)
Facility: HOSPITAL | Age: 58
Setting detail: HOSPITAL OUTPATIENT SURGERY
Discharge: HOME | End: 2018-11-19
Attending: INTERNAL MEDICINE | Admitting: INTERNAL MEDICINE
Payer: MEDICARE

## 2018-11-19 VITALS
BODY MASS INDEX: 18.96 KG/M2 | HEART RATE: 67 BPM | SYSTOLIC BLOOD PRESSURE: 108 MMHG | DIASTOLIC BLOOD PRESSURE: 72 MMHG | WEIGHT: 140 LBS | OXYGEN SATURATION: 98 % | HEIGHT: 72 IN | RESPIRATION RATE: 18 BRPM

## 2018-11-19 DIAGNOSIS — R63.4 WEIGHT LOSS: ICD-10-CM

## 2018-11-19 DIAGNOSIS — R93.5 ABNORMAL CT OF THE ABDOMEN: ICD-10-CM

## 2018-11-19 PROCEDURE — 45380 COLONOSCOPY AND BIOPSY: CPT | Performed by: INTERNAL MEDICINE

## 2018-11-19 PROCEDURE — 0DBB8ZX EXCISION OF ILEUM, VIA NATURAL OR ARTIFICIAL OPENING ENDOSCOPIC, DIAGNOSTIC: ICD-10-PCS | Performed by: INTERNAL MEDICINE

## 2018-11-19 RX ORDER — MIDAZOLAM HYDROCHLORIDE 1 MG/ML
INJECTION INTRAMUSCULAR; INTRAVENOUS AS NEEDED
Status: DISCONTINUED | OUTPATIENT
Start: 2018-11-19 | End: 2018-11-19 | Stop reason: SURG

## 2018-11-19 RX ORDER — NALOXONE HYDROCHLORIDE 0.4 MG/ML
80 INJECTION, SOLUTION INTRAMUSCULAR; INTRAVENOUS; SUBCUTANEOUS AS NEEDED
Status: DISCONTINUED | OUTPATIENT
Start: 2018-11-19 | End: 2018-11-19

## 2018-11-19 RX ORDER — LIDOCAINE HYDROCHLORIDE 10 MG/ML
INJECTION, SOLUTION EPIDURAL; INFILTRATION; INTRACAUDAL; PERINEURAL AS NEEDED
Status: DISCONTINUED | OUTPATIENT
Start: 2018-11-19 | End: 2018-11-19 | Stop reason: SURG

## 2018-11-19 RX ORDER — SODIUM CHLORIDE, SODIUM LACTATE, POTASSIUM CHLORIDE, CALCIUM CHLORIDE 600; 310; 30; 20 MG/100ML; MG/100ML; MG/100ML; MG/100ML
INJECTION, SOLUTION INTRAVENOUS CONTINUOUS
Status: DISCONTINUED | OUTPATIENT
Start: 2018-11-19 | End: 2018-11-19

## 2018-11-19 RX ADMIN — SODIUM CHLORIDE, SODIUM LACTATE, POTASSIUM CHLORIDE, CALCIUM CHLORIDE: 600; 310; 30; 20 INJECTION, SOLUTION INTRAVENOUS at 12:22:00

## 2018-11-19 RX ADMIN — SODIUM CHLORIDE, SODIUM LACTATE, POTASSIUM CHLORIDE, CALCIUM CHLORIDE: 600; 310; 30; 20 INJECTION, SOLUTION INTRAVENOUS at 12:45:00

## 2018-11-19 RX ADMIN — MIDAZOLAM HYDROCHLORIDE 2 MG: 1 INJECTION INTRAMUSCULAR; INTRAVENOUS at 12:18:00

## 2018-11-19 RX ADMIN — LIDOCAINE HYDROCHLORIDE 50 MG: 10 INJECTION, SOLUTION EPIDURAL; INFILTRATION; INTRACAUDAL; PERINEURAL at 12:18:00

## 2018-11-19 NOTE — H&P
History & Physical Examination    Patient Name: Horace Batista  MRN: [id number]  [facility]: [id number]  YOB: 1960    Diagnosis: LLQ abdominal pain, weight loss, abnormal CT abdomen      Medications Prior to Admission:  QUEtiapine Fumarate 200 MG Oral patient/family. They understand and agree to proceed with plan of care. I have reviewed the History and Physical done within the last 30 days. Any changes noted above.   Kodi Rosa MD  [facility], [facility] - Gastroenterology  11/19/2018  12:13 PM

## 2018-11-19 NOTE — ANESTHESIA POSTPROCEDURE EVALUATION
Patient: Salima Love    Procedure Summary     Date:  11/19/18 Room / Location:  [address] [place] [address] ENDOSCOPY 01 / [address] [place] [address] ENDOSCOPY    Anesthesia Start:  0428 Anesthesia Stop:  3468    Procedure:  COLONOSCOPY (N/A ) Diagnosis:       Abnormal CT of the abdomen      Weight l

## 2018-11-19 NOTE — OPERATIVE REPORT
Colonoscopy Report    Delma Josefa     3/7/1960 Age 59year old   PCP Akila Mo.  Estuardo Moser MD     Date of procedure: 18    Procedure: Colonoscopy w/ biopsy    Pre-operative diagnosis: LLQ abdominal pain, weight lo complications. The patient’s vital signs were monitored throughout the procedure and remained stable.     Estimated blood loss: insignificant    Specimens collected:  Ileal biopsies    Complications: none     Colonoscopy findings:  Terminal ileum: in the ap

## 2018-11-19 NOTE — ANESTHESIA PREPROCEDURE EVALUATION
Anesthesia PreOp Note    HPI:     Titus Prieto is a 59year old male who presents for preoperative consultation requested by: Elba Acuña MD    Date of Surgery: 11/19/2018    Procedure(s):  COLONOSCOPY  Indication: Abnormal CT of the abdomen , Ricardo Thurman Zolpidem Tartrate 10 MG Oral Tab TK 1 T PO HS PRN Disp:  Rfl: 1 Taking       Current Facility-Administered Medications Ordered in Epic:  lactated ringers infusion  Intravenous Continuous Cheryl Aguilar MD     No current Epic-ordered outpatient medic Vital Signs: Body mass index is 18.99 kg/m². height is 1.829 m (6') and weight is 63.5 kg (140 lb).     11/19/18  1109   Weight: 63.5 kg (140 lb)   Height: 1.829 m (6')        Anesthesia ROS/Med Hx and Physical Exam     Patient summary reviewed

## 2018-11-21 ENCOUNTER — TELEPHONE (OUTPATIENT)
Dept: GASTROENTEROLOGY | Facility: CLINIC | Age: 58
End: 2018-11-21

## 2018-11-21 ENCOUNTER — TELEPHONE (OUTPATIENT)
Dept: FAMILY MEDICINE CLINIC | Facility: CLINIC | Age: 58
End: 2018-11-21

## 2018-11-21 NOTE — TELEPHONE ENCOUNTER
Called patient to discuss results of biopsies. No answer. Left voice message to call back.     GI Staff:    Please try to contact this patient to schedule a follow up appointment    Thanks    Stephany Rehman MD  [facility], [facility] - Gastroenterology  11

## 2018-11-21 NOTE — TELEPHONE ENCOUNTER
Please transfer call back to Triage:    Call transferred by CSS: Pt states a finger on his right hand (would not state which one) is purple and severely damaged (would not answer since when or how).  Pt not answering questions--states just needs an appointm

## 2018-11-23 NOTE — TELEPHONE ENCOUNTER
Patient has appointment  Future Appointments   Date Time Provider Yuval Coles   11/27/2018  6:30 PM Yudelka Boucher [facility] [place]

## 2018-11-26 NOTE — TELEPHONE ENCOUNTER
Dr María Jurado,    I offered the pt an appt with you tomorrow which he declined. He states that this is a bad time at work and no time is convenient for him to come for a f/u appt.  Pt is asking if you can call him with the results of his bx

## 2018-11-26 NOTE — TELEPHONE ENCOUNTER
Called patient. No answer. Left voice message.     Nay Perales MD  [facility], [facility] - Gastroenterology  11/26/2018  3:26 PM

## 2018-11-27 ENCOUNTER — OFFICE VISIT (OUTPATIENT)
Dept: FAMILY MEDICINE CLINIC | Facility: CLINIC | Age: 58
End: 2018-11-27
Payer: MEDICARE

## 2018-11-27 VITALS
BODY MASS INDEX: 18.45 KG/M2 | SYSTOLIC BLOOD PRESSURE: 135 MMHG | WEIGHT: 139.19 LBS | HEIGHT: 73 IN | HEART RATE: 49 BPM | DIASTOLIC BLOOD PRESSURE: 77 MMHG

## 2018-11-27 DIAGNOSIS — L03.019 CELLULITIS OF FINGER, UNSPECIFIED LATERALITY: Primary | ICD-10-CM

## 2018-11-27 PROCEDURE — G0463 HOSPITAL OUTPT CLINIC VISIT: HCPCS | Performed by: FAMILY MEDICINE

## 2018-11-27 PROCEDURE — 99213 OFFICE O/P EST LOW 20 MIN: CPT | Performed by: FAMILY MEDICINE

## 2018-11-27 RX ORDER — CEPHALEXIN 500 MG/1
500 TABLET ORAL 4 TIMES DAILY
Qty: 40 TABLET | Refills: 0 | Status: SHIPPED | OUTPATIENT
Start: 2018-11-27 | End: 2019-04-01 | Stop reason: ALTCHOICE

## 2018-11-27 NOTE — TELEPHONE ENCOUNTER
Called patient back. No answer. Will send my chart message.     Please try to contact the patient for a follow up appointment in the next 2-3 weeks    Thanks    Cecilia Arce MD  [facility], [facility] - Gastroenterology  11/27/2018  2:45 PM

## 2018-11-28 NOTE — PROGRESS NOTES
Blood pressure 135/77, pulse (!) 49, height 6' 1\" (1.854 m), weight 139 lb 3 oz (63.1 kg). Patient presents today complaining of 3-4-day history of index finger pain. He reports that he has not injured it recently.   He does get rashes in the wintertim

## 2018-11-28 NOTE — PATIENT INSTRUCTIONS
Raynaud Disease  Your healthcare provider has told you that you have Raynaud disease. It is also called Raynaud phenomenon or Raynaud syndrome. There is no cure for Raynaud disease, but you can manage it to help prevent attacks.     What are the symptoms · Having a family member with Raynaud disease increases one's risk. · Underlying rheumatoid conditions may increase one's risk.   What are possible triggers?   Triggers for Raynaud disease include:   · Cold  · Stress  · Caffeine  · Smoking  · Repetitive mo · Nerve surgery for severe cases that don’t respond to other treatments. Surgery removes the nerves that surround the blood vessels in the hands and feet. Without nerve stimulation, the blood vessels stay more relaxed.  They are less likely to become very n

## 2018-11-28 NOTE — TELEPHONE ENCOUNTER
Pt retured Dr catherine, pt indicates he is having difficulty getting to mychart, pls cb at:[phone],thanks.

## 2018-11-30 ENCOUNTER — OFFICE VISIT (OUTPATIENT)
Dept: FAMILY MEDICINE CLINIC | Facility: CLINIC | Age: 58
End: 2018-11-30
Payer: MEDICARE

## 2018-11-30 VITALS
DIASTOLIC BLOOD PRESSURE: 75 MMHG | HEART RATE: 71 BPM | HEIGHT: 73 IN | WEIGHT: 142 LBS | SYSTOLIC BLOOD PRESSURE: 106 MMHG | BODY MASS INDEX: 18.82 KG/M2

## 2018-11-30 DIAGNOSIS — R63.4 WEIGHT LOSS: Primary | ICD-10-CM

## 2018-11-30 PROCEDURE — 99213 OFFICE O/P EST LOW 20 MIN: CPT | Performed by: FAMILY MEDICINE

## 2018-11-30 PROCEDURE — G0463 HOSPITAL OUTPT CLINIC VISIT: HCPCS | Performed by: FAMILY MEDICINE

## 2018-11-30 NOTE — PROGRESS NOTES
Blood pressure 106/75, pulse 71, height 6' 1\" (1.854 m), weight 142 lb (64.4 kg). Patient presents today following up for weight loss history of schizophrenia also infection of the finger.   He reports that he feels well    Objective    Erythema improve

## 2018-12-05 ENCOUNTER — LAB ENCOUNTER (OUTPATIENT)
Dept: LAB | Age: 58
End: 2018-12-05
Attending: FAMILY MEDICINE
Payer: MEDICARE

## 2018-12-05 DIAGNOSIS — R63.4 WEIGHT LOSS: ICD-10-CM

## 2018-12-05 PROCEDURE — 85025 COMPLETE CBC W/AUTO DIFF WBC: CPT

## 2018-12-05 PROCEDURE — 80053 COMPREHEN METABOLIC PANEL: CPT

## 2018-12-05 PROCEDURE — 36415 COLL VENOUS BLD VENIPUNCTURE: CPT

## 2019-01-22 ENCOUNTER — TELEPHONE (OUTPATIENT)
Dept: FAMILY MEDICINE CLINIC | Facility: CLINIC | Age: 59
End: 2019-01-22

## 2019-01-22 NOTE — TELEPHONE ENCOUNTER
Pt has questions regarding his medication. Per pt this is regarding his medication from the date of 11-30-18.

## 2019-01-22 NOTE — TELEPHONE ENCOUNTER
Pt states that he had blood work done on last year on 11-30-18. Pt states that he does not remember getting the results and would like a call back with them.

## 2019-01-23 ENCOUNTER — APPOINTMENT (OUTPATIENT)
Dept: LAB | Age: 59
End: 2019-01-23
Attending: FAMILY MEDICINE
Payer: MEDICARE

## 2019-01-23 DIAGNOSIS — D50.8 OTHER IRON DEFICIENCY ANEMIA: ICD-10-CM

## 2019-01-23 LAB
HCT VFR BLD AUTO: 39.9 % (ref 41–52)
HGB BLD-MCNC: 13.6 G/DL (ref 13.5–17.5)
IRON SATN MFR SERPL: 22 % (ref 20–55)
IRON SERPL-MCNC: 65 MCG/DL (ref 45–182)
TIBC SERPL-MCNC: 294 MCG/DL (ref 228–428)
TRANSFERRIN SERPL-MCNC: 223 MG/DL (ref 180–329)
VIT B12 SERPL-MCNC: 315 PG/ML (ref 181–914)

## 2019-01-23 PROCEDURE — 83540 ASSAY OF IRON: CPT

## 2019-01-23 PROCEDURE — 82607 VITAMIN B-12: CPT

## 2019-01-23 PROCEDURE — 83021 HEMOGLOBIN CHROMOTOGRAPHY: CPT

## 2019-01-23 PROCEDURE — 83020 HEMOGLOBIN ELECTROPHORESIS: CPT

## 2019-01-23 PROCEDURE — 85014 HEMATOCRIT: CPT

## 2019-01-23 PROCEDURE — 84466 ASSAY OF TRANSFERRIN: CPT

## 2019-01-23 PROCEDURE — 36415 COLL VENOUS BLD VENIPUNCTURE: CPT

## 2019-01-23 PROCEDURE — 85018 HEMOGLOBIN: CPT

## 2019-01-23 NOTE — TELEPHONE ENCOUNTER
LMTCB. It is unclear if [facility] RN spoke with pt yesterday as no documentation noted, only call intake at top of this encounter.

## 2019-01-23 NOTE — TELEPHONE ENCOUNTER
Patient contacted, reviewed result notes below from  [facility] 12/10/18, along with the request to complete the new orders, patient verbalized understanding and agreed to complete.

## 2019-01-23 NOTE — TELEPHONE ENCOUNTER
Pt had labs done on 12/5/18 hgb was 13.0 . Dr. Balbina Galindo, you sent pt a [employer] message he did not read, therefore he did not repeat the labs as you asked. Should he do them now, are there any others you want to add at this point?        Salas Ybarra,

## 2019-01-25 NOTE — TELEPHONE ENCOUNTER
Pt is asking if he is supposed to be taking the Mesalamine  mg that he noted on the AVS from his 11/27/18 OV. Pt not sure why that medication was prescribed.      Please advise

## 2019-01-28 NOTE — TELEPHONE ENCOUNTER
LMTCB transfer to triage, advised to check MyChart or call back. Desmond Thornton RN   to Velasquez Yasmin           1/25/19 12:51 PM   Davey Layton,     Per Dr. Macrina Christy, he advises that there is no need to take mesalamine.  Please call us at [phone] to

## 2019-01-31 LAB
HGB A2 MFR BLD: 2.8 % (ref 1.5–3.5)
HGB F MFR BLD: 0.4 % (ref 0–2)
HGB PNL BLD ELPH: 96.8 % (ref 95.5–100)

## 2019-02-15 ENCOUNTER — OFFICE VISIT (OUTPATIENT)
Dept: FAMILY MEDICINE CLINIC | Facility: CLINIC | Age: 59
End: 2019-02-15
Payer: MEDICARE

## 2019-02-15 VITALS
BODY MASS INDEX: 18.69 KG/M2 | SYSTOLIC BLOOD PRESSURE: 108 MMHG | HEIGHT: 73 IN | WEIGHT: 141 LBS | DIASTOLIC BLOOD PRESSURE: 76 MMHG | HEART RATE: 80 BPM

## 2019-02-15 DIAGNOSIS — R63.4 WEIGHT LOSS: Primary | ICD-10-CM

## 2019-02-15 PROCEDURE — 99212 OFFICE O/P EST SF 10 MIN: CPT | Performed by: FAMILY MEDICINE

## 2019-02-15 PROCEDURE — G0463 HOSPITAL OUTPT CLINIC VISIT: HCPCS | Performed by: FAMILY MEDICINE

## 2019-02-15 NOTE — PROGRESS NOTES
Blood pressure 108/76, pulse 80, height 6' 1\" (1.854 m), weight 141 lb (64 kg). Patient presents today following up for weight loss and history of anemia. He had questions about the hemoglobin electrophoresis results.     Objective patient comfortable

## 2019-03-05 ENCOUNTER — TELEPHONE (OUTPATIENT)
Dept: FAMILY MEDICINE CLINIC | Facility: CLINIC | Age: 59
End: 2019-03-05

## 2019-03-05 DIAGNOSIS — Z11.4 ENCOUNTER FOR HIV (HUMAN IMMUNODEFICIENCY VIRUS) TEST: Primary | ICD-10-CM

## 2019-03-05 NOTE — TELEPHONE ENCOUNTER
Regarding: FW:Medication Question  Contact: [phone]      ----- Message -----  From: Amarilys Mckinley RN  Sent: 3/4/2019   7:17 PM  To: Sahra Infante DO  Subject: RE:Medication Question                           ----- Message from Dorothea Perales

## 2019-03-15 ENCOUNTER — TELEPHONE (OUTPATIENT)
Dept: GASTROENTEROLOGY | Facility: CLINIC | Age: 59
End: 2019-03-15

## 2019-03-15 NOTE — TELEPHONE ENCOUNTER
Pt asking to talk to Dr about issues he is having with his stomach and healing from Erik Bolanos 63 done in November - and a diet plan

## 2019-03-15 NOTE — TELEPHONE ENCOUNTER
Discussed case briefly with Dr. Stormy Bernal and as previously recommended pt to f/u for OV to discuss next steps, diet, sxs. Mychart message sent to pt to schedule f/u appt.     \"Written by Arnaldo Lopez MD on 11/27/2018  2:46 PM   Hi Mr. Kenyatta Espinal

## 2019-03-18 NOTE — TELEPHONE ENCOUNTER
Yes, he should follow up in clinic.     Thanks    Brien Staley MD  [facility], [facility] - Gastroenterology  3/18/2019  8:26 AM

## 2019-04-01 ENCOUNTER — OFFICE VISIT (OUTPATIENT)
Dept: FAMILY MEDICINE CLINIC | Facility: CLINIC | Age: 59
End: 2019-04-01
Payer: MEDICARE

## 2019-04-01 ENCOUNTER — LAB ENCOUNTER (OUTPATIENT)
Dept: LAB | Age: 59
End: 2019-04-01
Attending: FAMILY MEDICINE
Payer: MEDICARE

## 2019-04-01 VITALS
DIASTOLIC BLOOD PRESSURE: 65 MMHG | WEIGHT: 139 LBS | SYSTOLIC BLOOD PRESSURE: 111 MMHG | TEMPERATURE: 98 F | HEART RATE: 66 BPM | BODY MASS INDEX: 18 KG/M2

## 2019-04-01 DIAGNOSIS — R63.4 WEIGHT LOSS: ICD-10-CM

## 2019-04-01 DIAGNOSIS — Z11.4 ENCOUNTER FOR HIV (HUMAN IMMUNODEFICIENCY VIRUS) TEST: ICD-10-CM

## 2019-04-01 DIAGNOSIS — R19.7 DIARRHEA, UNSPECIFIED TYPE: ICD-10-CM

## 2019-04-01 DIAGNOSIS — Z12.5 SCREENING FOR PROSTATE CANCER: ICD-10-CM

## 2019-04-01 DIAGNOSIS — N32.81 OVERACTIVE BLADDER: ICD-10-CM

## 2019-04-01 DIAGNOSIS — Z91.09 OTHER ALLERGY STATUS, OTHER THAN TO DRUGS AND BIOLOGICAL SUBSTANCES: ICD-10-CM

## 2019-04-01 DIAGNOSIS — R63.4 WEIGHT LOSS: Primary | ICD-10-CM

## 2019-04-01 DIAGNOSIS — Z79.899 OTHER LONG TERM (CURRENT) DRUG THERAPY: ICD-10-CM

## 2019-04-01 PROCEDURE — 82785 ASSAY OF IGE: CPT

## 2019-04-01 PROCEDURE — 84443 ASSAY THYROID STIM HORMONE: CPT

## 2019-04-01 PROCEDURE — 85025 COMPLETE CBC W/AUTO DIFF WBC: CPT

## 2019-04-01 PROCEDURE — G0463 HOSPITAL OUTPT CLINIC VISIT: HCPCS | Performed by: FAMILY MEDICINE

## 2019-04-01 PROCEDURE — 87389 HIV-1 AG W/HIV-1&-2 AB AG IA: CPT

## 2019-04-01 PROCEDURE — 86003 ALLG SPEC IGE CRUDE XTRC EA: CPT

## 2019-04-01 PROCEDURE — 83516 IMMUNOASSAY NONANTIBODY: CPT

## 2019-04-01 PROCEDURE — 99215 OFFICE O/P EST HI 40 MIN: CPT | Performed by: FAMILY MEDICINE

## 2019-04-01 PROCEDURE — 36415 COLL VENOUS BLD VENIPUNCTURE: CPT

## 2019-04-01 PROCEDURE — 82784 ASSAY IGA/IGD/IGG/IGM EACH: CPT

## 2019-04-01 NOTE — PROGRESS NOTES
\"Dr. Chadd Agarwal is my physician but I wanted to get another opinion. My concern today is about my immune system. There are 10 or 15 medications about that , azt and other medications cna maybe help my immunity. \"  I have been in different clinics for psychol regular rate and rhythm normal S1-S2 no S3 no S4 there were no murmurs appreciated  Lymphatic: There were no anterior or posterior cervical adenopathy or supraclavicular adenopathy palpated  Extremities there was no cyanosis or edema      Assessment/Plan

## 2019-04-08 ENCOUNTER — TELEPHONE (OUTPATIENT)
Dept: GASTROENTEROLOGY | Facility: CLINIC | Age: 59
End: 2019-04-08

## 2019-04-09 NOTE — TELEPHONE ENCOUNTER
1st LMTCB- per previous TE's pt needs OV with Dr. Rupinder Amaro DR. MG, pt has been informed numerous times.

## 2019-04-12 NOTE — TELEPHONE ENCOUNTER
2nd LMTCB- per previous TE's pt needs OV with Dr. Junior Wilson, pt has been informed numerous times.

## 2019-04-16 NOTE — TELEPHONE ENCOUNTER
Pt returned call. Attempted to schedule appt multiple times with Dr. Fina Benitez but pt absolutely refused to schedule appt and would like to speak to RN before scheduling appt.    Advised him that per note from RN we needed to schedule an appt but he still

## 2019-04-16 NOTE — TELEPHONE ENCOUNTER
3rd LMTCB- per previous TE's pt needs OV with Dr. Scarlet Leo. No response letter generated and mailed to pt home address. CSS- PLEASE SCHEDULE OV WITH DR. BAILEY, pt has been informed numerous times.

## 2019-06-12 ENCOUNTER — HOSPITAL ENCOUNTER (OUTPATIENT)
Dept: GENERAL RADIOLOGY | Age: 59
Discharge: HOME | End: 2019-06-12
Attending: PHYSICIAN ASSISTANT
Payer: MEDICARE

## 2019-06-12 ENCOUNTER — OFFICE VISIT (OUTPATIENT)
Dept: FAMILY MEDICINE CLINIC | Facility: CLINIC | Age: 59
End: 2019-06-12
Payer: MEDICARE

## 2019-06-12 VITALS
HEART RATE: 72 BPM | BODY MASS INDEX: 19.27 KG/M2 | HEIGHT: 73 IN | RESPIRATION RATE: 15 BRPM | SYSTOLIC BLOOD PRESSURE: 102 MMHG | DIASTOLIC BLOOD PRESSURE: 64 MMHG | WEIGHT: 145.38 LBS | TEMPERATURE: 100 F

## 2019-06-12 DIAGNOSIS — J22 LOWER RESPIRATORY INFECTION: Primary | ICD-10-CM

## 2019-06-12 DIAGNOSIS — R05.9 COUGH: ICD-10-CM

## 2019-06-12 DIAGNOSIS — J30.0 VASOMOTOR RHINITIS: ICD-10-CM

## 2019-06-12 PROCEDURE — 71046 X-RAY EXAM CHEST 2 VIEWS: CPT | Performed by: PHYSICIAN ASSISTANT

## 2019-06-12 PROCEDURE — 99213 OFFICE O/P EST LOW 20 MIN: CPT | Performed by: PHYSICIAN ASSISTANT

## 2019-06-12 PROCEDURE — G0463 HOSPITAL OUTPT CLINIC VISIT: HCPCS | Performed by: PHYSICIAN ASSISTANT

## 2019-06-12 RX ORDER — BENZONATATE 200 MG/1
200 CAPSULE ORAL 3 TIMES DAILY PRN
Qty: 30 CAPSULE | Refills: 0 | Status: SHIPPED | OUTPATIENT
Start: 2019-06-12 | End: 2019-09-19

## 2019-06-12 RX ORDER — LEVOCETIRIZINE DIHYDROCHLORIDE 5 MG/1
5 TABLET, FILM COATED ORAL EVERY EVENING
Qty: 90 TABLET | Refills: 1 | Status: SHIPPED | OUTPATIENT
Start: 2019-06-12 | End: 2019-09-19

## 2019-06-12 RX ORDER — AZITHROMYCIN 250 MG/1
TABLET, FILM COATED ORAL
Qty: 6 TABLET | Refills: 0 | Status: SHIPPED | OUTPATIENT
Start: 2019-06-12 | End: 2019-09-19

## 2019-06-12 NOTE — PROGRESS NOTES
HPI:   Cough   This is a new problem. The current episode started 1 to 4 weeks ago. The problem has been unchanged. The cough is productive of sputum.  Associated symptoms include chills, a fever, headaches, myalgias, nasal congestion, postnasal drip and rh Procedure Laterality Date   • Colonoscopy      about 12+ yrs ago @ [place]   • Colonoscopy N/A 11/19/2018    Procedure: COLONOSCOPY;  Surgeon: Cinthia Barber MD;  Location: [facility] ENDOSCOPY   • Skin surgery  2007    excision sebaceous cyst(s) from mid Caffeine Concern: Yes          Tea;  Soda        Occupational Exposure: Not Asked        Hobby Hazards: Not Asked        Sleep Concern: Not Asked        Stress Concern: Not Asked        Weight Concern: Not Asked        Special Diet: Not Asked        Back Ca sinus exhibits maxillary sinus tenderness and frontal sinus tenderness. Mouth/Throat: Oropharynx is clear and moist.   Eyes: Conjunctivae are normal.   Cardiovascular: Normal rate, regular rhythm, S1 normal, S2 normal and normal heart sounds.     No murmu

## 2019-09-19 ENCOUNTER — OFFICE VISIT (OUTPATIENT)
Dept: FAMILY MEDICINE CLINIC | Facility: CLINIC | Age: 59
End: 2019-09-19
Payer: MEDICARE

## 2019-09-19 ENCOUNTER — APPOINTMENT (OUTPATIENT)
Dept: LAB | Age: 59
End: 2019-09-19
Attending: FAMILY MEDICINE
Payer: MEDICARE

## 2019-09-19 VITALS
DIASTOLIC BLOOD PRESSURE: 68 MMHG | WEIGHT: 148 LBS | HEIGHT: 73 IN | BODY MASS INDEX: 19.61 KG/M2 | TEMPERATURE: 98 F | HEART RATE: 76 BPM | SYSTOLIC BLOOD PRESSURE: 98 MMHG

## 2019-09-19 DIAGNOSIS — Z79.899 OTHER LONG TERM (CURRENT) DRUG THERAPY: ICD-10-CM

## 2019-09-19 DIAGNOSIS — R35.0 FREQUENT URINATION: ICD-10-CM

## 2019-09-19 DIAGNOSIS — R39.15 URGENCY OF URINATION: Primary | ICD-10-CM

## 2019-09-19 DIAGNOSIS — N32.81 OVERACTIVE BLADDER: ICD-10-CM

## 2019-09-19 LAB
BACTERIA UR QL AUTO: NEGATIVE /HPF
BILIRUB UR QL: NEGATIVE
CLARITY UR: CLEAR
COLOR UR: YELLOW
GLUCOSE UR-MCNC: NEGATIVE MG/DL
HGB UR QL STRIP.AUTO: NEGATIVE
KETONES UR-MCNC: NEGATIVE MG/DL
LEUKOCYTE ESTERASE UR QL STRIP.AUTO: NEGATIVE
NITRITE UR QL STRIP.AUTO: NEGATIVE
PH UR: 5 [PH] (ref 5–8)
PROT UR-MCNC: NEGATIVE MG/DL
RBC #/AREA URNS AUTO: 1 /HPF
SP GR UR STRIP: 1.02 (ref 1–1.03)
UROBILINOGEN UR STRIP-ACNC: <2
WBC #/AREA URNS AUTO: 2 /HPF

## 2019-09-19 PROCEDURE — 81001 URINALYSIS AUTO W/SCOPE: CPT

## 2019-09-19 PROCEDURE — 99214 OFFICE O/P EST MOD 30 MIN: CPT | Performed by: FAMILY MEDICINE

## 2019-09-19 PROCEDURE — G0463 HOSPITAL OUTPT CLINIC VISIT: HCPCS | Performed by: FAMILY MEDICINE

## 2019-09-19 PROCEDURE — 87086 URINE CULTURE/COLONY COUNT: CPT

## 2019-09-19 RX ORDER — SOLIFENACIN SUCCINATE 5 MG/1
5 TABLET, FILM COATED ORAL DAILY
Qty: 30 TABLET | Refills: 1 | Status: SHIPPED | OUTPATIENT
Start: 2019-09-19 | End: 2019-11-18

## 2019-11-15 ENCOUNTER — TELEPHONE (OUTPATIENT)
Dept: FAMILY MEDICINE CLINIC | Facility: CLINIC | Age: 59
End: 2019-11-15

## 2019-11-15 NOTE — TELEPHONE ENCOUNTER
Patient is calling regarding medication Solifenacin Succinate 5 MG Oral Tab. Patient is requesting a higher dose of medication due to patient stating prescribed dose seems too little. Please advise.

## 2019-11-16 NOTE — TELEPHONE ENCOUNTER
Left message to call back. Please transfer to triage. 1st attempt. I have also sent patient a [employer] message to call us back.

## 2019-11-18 RX ORDER — SOLIFENACIN SUCCINATE 10 MG/1
10 TABLET, FILM COATED ORAL DAILY
Qty: 90 TABLET | Refills: 0 | Status: SHIPPED | OUTPATIENT
Start: 2019-11-18

## 2019-11-18 NOTE — TELEPHONE ENCOUNTER
Pt states he would like to increase Solifenacin Succinate 5 MG. Pt states medication is not working. Pt would not provide any additional information.  Please advise

## 2020-01-05 ENCOUNTER — TELEPHONE (OUTPATIENT)
Dept: FAMILY MEDICINE CLINIC | Facility: CLINIC | Age: 60
End: 2020-01-05

## 2020-01-06 NOTE — TELEPHONE ENCOUNTER
Left  detailed message to make appt ,per Dr message 11/26/19 after increasing dosage.   mychart message sent

## 2020-01-06 NOTE — TELEPHONE ENCOUNTER
Patient sent a [employer] message stating  the medication for my bladder does not cure the problem.  Should I take a dose greater than 10mg

## 2020-01-07 NOTE — TELEPHONE ENCOUNTER
Left message to call back. [employer] message that was sent yesterday not read yet. As of this  moment no FU OV yet.

## 2020-01-08 NOTE — TELEPHONE ENCOUNTER
Left detailed message on pt VM (ok per DEMARCO). Pt has scheduled 1/13/20 with Dr Ranelle Lanes. Advised pt to call if any further questions.

## 2020-01-13 ENCOUNTER — OFFICE VISIT (OUTPATIENT)
Dept: FAMILY MEDICINE CLINIC | Facility: CLINIC | Age: 60
End: 2020-01-13
Payer: MEDICARE

## 2020-01-13 VITALS
BODY MASS INDEX: 20.54 KG/M2 | WEIGHT: 155 LBS | HEART RATE: 73 BPM | HEIGHT: 73 IN | DIASTOLIC BLOOD PRESSURE: 54 MMHG | TEMPERATURE: 98 F | SYSTOLIC BLOOD PRESSURE: 109 MMHG

## 2020-01-13 DIAGNOSIS — R04.0 EPISTAXIS: ICD-10-CM

## 2020-01-13 DIAGNOSIS — N50.89 MASS, SCROTUM: ICD-10-CM

## 2020-01-13 DIAGNOSIS — R35.0 FREQUENCY OF URINATION: ICD-10-CM

## 2020-01-13 DIAGNOSIS — R39.15 URGENCY OF URINATION: Primary | ICD-10-CM

## 2020-01-13 PROCEDURE — G0463 HOSPITAL OUTPT CLINIC VISIT: HCPCS | Performed by: FAMILY MEDICINE

## 2020-01-13 PROCEDURE — 99214 OFFICE O/P EST MOD 30 MIN: CPT | Performed by: FAMILY MEDICINE

## 2020-01-13 NOTE — PROGRESS NOTES
Continue to have uretral bladder irriation but when he started to eliminating tea. Taking vesicare.     Goes to the bathroom constantly    Mass for years  Now getting larger   And painful     Epistaxis  Daily for weeks  Breathing water can help  vicks and

## 2020-02-06 ENCOUNTER — OFFICE VISIT (OUTPATIENT)
Dept: SURGERY | Facility: CLINIC | Age: 60
End: 2020-02-06
Payer: MEDICARE

## 2020-02-06 VITALS
WEIGHT: 155 LBS | DIASTOLIC BLOOD PRESSURE: 71 MMHG | HEART RATE: 70 BPM | BODY MASS INDEX: 20 KG/M2 | SYSTOLIC BLOOD PRESSURE: 109 MMHG

## 2020-02-06 DIAGNOSIS — N40.1 BENIGN PROSTATIC HYPERPLASIA WITH LOWER URINARY TRACT SYMPTOMS, SYMPTOM DETAILS UNSPECIFIED: Primary | ICD-10-CM

## 2020-02-06 PROCEDURE — G0463 HOSPITAL OUTPT CLINIC VISIT: HCPCS | Performed by: UROLOGY

## 2020-02-06 PROCEDURE — 99204 OFFICE O/P NEW MOD 45 MIN: CPT | Performed by: UROLOGY

## 2020-02-06 RX ORDER — TAMSULOSIN HYDROCHLORIDE 0.4 MG/1
0.4 CAPSULE ORAL DAILY
Qty: 90 CAPSULE | Refills: 3 | Status: SHIPPED | OUTPATIENT
Start: 2020-02-06 | End: 2020-03-07

## 2020-02-06 NOTE — PROGRESS NOTES
SUBJECTIVE:  Linda Antoine is a 62year old male who presents for a consultation at the request of, and a copy of this note will be sent to, Dr. Baron Ivan, for evaluation of  benign prostatic hyperplasia. He states that the problem is unchanged.  Sym Smoking status: Former Smoker        Years: 14.00      Smokeless tobacco: Former User      Tobacco comment: 5 cigarettes a day    Alcohol use: No      Alcohol/week: 0.0 standard drinks    Drug use: No           REVIEW OF SYSTEMS:  RESPIRATORY:  Negative the defined types were placed in this encounter. Recommended:  – Obtain scrotal ultrasound as scheduled. – We will start him empirically on tamsulosin 0.4 mg daily. Side effect of this medication were discussed with the patient.   – Uroflow and bladder u

## 2020-04-07 ENCOUNTER — TELEPHONE (OUTPATIENT)
Dept: GASTROENTEROLOGY | Facility: CLINIC | Age: 60
End: 2020-04-07

## 2020-04-07 NOTE — TELEPHONE ENCOUNTER
Patient contacted and states he would like make a follow up appointment with Dr. Rene Simmons. Patient states he is feeling well. No complaints at this time.     Appointment made for 05/15/2020 at 1 pm. Patient advised to arrive 15 minutes prior to appointmen

## 2020-04-07 NOTE — TELEPHONE ENCOUNTER
Pt stopped at desk requesting to sp with Dr Joseph Calvillo- advised pt not possible now and I would put message in--    Pt st it has been \"awhile\" since his last scope and he feels everything has worked out for  but would like to sp with - kathy das

## 2020-04-20 ENCOUNTER — TELEPHONE (OUTPATIENT)
Dept: SURGERY | Facility: CLINIC | Age: 60
End: 2020-04-20

## 2020-04-20 NOTE — TELEPHONE ENCOUNTER
Pt states medication from the last visit is not working and pt wants an appt because medication is not working.  Did explain not seeing any non acute appt still wants to see the doctor please advise     Explained to pt someone will give him a call back by phone he said he gets all his messages through [address] asked did he want them to call him he said no he wants to be contacted through [address] says that's how he wants to be contacted back please advise

## 2020-06-08 ENCOUNTER — NURSE TRIAGE (OUTPATIENT)
Dept: FAMILY MEDICINE CLINIC | Facility: CLINIC | Age: 60
End: 2020-06-08

## 2020-06-08 NOTE — TELEPHONE ENCOUNTER
Please reply to pool: EM RN TRIAGE      Action Requested: Summary for Provider     []  Critical Lab, Recommendations Needed  [] Need Additional Advice  [x]   FYI    []   Need Orders  [] Need Medications Sent to Pharmacy  []  Other     SUMMARY: Offered Doxi

## 2020-06-16 ENCOUNTER — TELEPHONE (OUTPATIENT)
Dept: SURGERY | Facility: CLINIC | Age: 60
End: 2020-06-16

## 2020-06-16 NOTE — TELEPHONE ENCOUNTER
Patient says tamsulosin is not working and is asking for a higher dose.  Please advise- ([facility] called on behalf of pt)

## 2020-06-17 NOTE — TELEPHONE ENCOUNTER
Patient contacted. Patient states tamsulosin is not helping his symptoms. Patient did not want to give me his symptoms, states he may need a stronger dose. Again, patient did not want to discuss urinary symptoms.   Patient states he would like to discuss

## 2020-08-17 ENCOUNTER — TELEPHONE (OUTPATIENT)
Dept: SURGERY | Facility: CLINIC | Age: 60
End: 2020-08-17

## 2020-08-17 NOTE — TELEPHONE ENCOUNTER
Last office visit = 2/6/2020; c/o urinary frequency, not tolerating tamsulosin, requesting appt in morning. Offered tomorrow @ 10:30 am. Patient agreed. See 6/16/20 TE; acute.

## 2020-08-17 NOTE — TELEPHONE ENCOUNTER
Pt. States that prescribed med is not helping him with his symptoms and it is affecting him with his job. Pt. States that he is going to the bathroom 6 times a day. Pt. Has a sched appt. On 8/31/2020, and wants to know if he can be seen sooner?

## 2020-08-18 ENCOUNTER — OFFICE VISIT (OUTPATIENT)
Dept: SURGERY | Facility: CLINIC | Age: 60
End: 2020-08-18
Payer: MEDICARE

## 2020-08-18 VITALS
SYSTOLIC BLOOD PRESSURE: 114 MMHG | DIASTOLIC BLOOD PRESSURE: 65 MMHG | HEART RATE: 71 BPM | WEIGHT: 150 LBS | BODY MASS INDEX: 20 KG/M2

## 2020-08-18 DIAGNOSIS — R35.0 URINARY FREQUENCY: Primary | ICD-10-CM

## 2020-08-18 DIAGNOSIS — N40.1 BENIGN PROSTATIC HYPERPLASIA WITH LOWER URINARY TRACT SYMPTOMS, SYMPTOM DETAILS UNSPECIFIED: ICD-10-CM

## 2020-08-18 PROCEDURE — G0463 HOSPITAL OUTPT CLINIC VISIT: HCPCS | Performed by: UROLOGY

## 2020-08-18 PROCEDURE — 99213 OFFICE O/P EST LOW 20 MIN: CPT | Performed by: UROLOGY

## 2020-08-18 RX ORDER — TAMSULOSIN HYDROCHLORIDE 0.4 MG/1
CAPSULE ORAL
COMMUNITY
Start: 2020-06-25 | End: 2021-09-02

## 2020-08-18 NOTE — PROGRESS NOTES
Fish Lazar is a 56year old male. HPI:   Patient presents with:  BPH: PT presents for follow up visit for urinary frequency. PT states he feels like he is emptying his bladder, denies dsuria or hematuria.        70-year-old male in follow-up to visit DAY - 1/2 HOUR AFTER SAME MEAL EACH DAY     • QUEtiapine Fumarate 200 MG Oral Tab TAKE 1 TABLET BY MOUTH EVERY EVENING (TOTAL OF 500MG PER DAY)  2   • QUEtiapine Fumarate 300 MG Oral Tab one tablet in the morning  2   • Solifenacin Succinate 10 MG Oral Tab

## 2020-09-09 ENCOUNTER — TELEPHONE (OUTPATIENT)
Dept: SURGERY | Facility: CLINIC | Age: 60
End: 2020-09-09

## 2020-09-09 NOTE — TELEPHONE ENCOUNTER
Tried to reaCH PT AND HAD TO lmtcb ABOUT CHANGING HIS APPT FOR HIS UPCOMING cmg TEST TO 9/17 AT 11 AM

## 2020-09-25 ENCOUNTER — TELEPHONE (OUTPATIENT)
Dept: FAMILY MEDICINE CLINIC | Facility: CLINIC | Age: 60
End: 2020-09-25

## 2020-09-25 NOTE — TELEPHONE ENCOUNTER
Pt stopped by [place] office stts he wants COVID test done. Was not able to give any symptoms. Pt was just requesting test to be done or Dr to call back. Please advise.

## 2020-09-28 NOTE — TELEPHONE ENCOUNTER
Patient returning call. He was upset because he was turned away at [facility] when he presented at [facility] requesting to be tested for Covid. Per patient, he was asked to wait outside and contact his physician's office for an order.  Patient is as

## 2020-11-16 ENCOUNTER — PATIENT MESSAGE (OUTPATIENT)
Dept: FAMILY MEDICINE CLINIC | Facility: CLINIC | Age: 60
End: 2020-11-16

## 2020-11-16 NOTE — TELEPHONE ENCOUNTER
From: Wil Jacome  To: Alice Vides DO  Sent: 11/16/2020 8:17 AM CST  Subject: Other    I really need to visit my sister in [place]. This morning I learned that people should get a negative test result for Corona Virus before traveling out of [facility].

## 2021-03-13 DIAGNOSIS — Z23 NEED FOR VACCINATION: ICD-10-CM

## 2021-04-12 RX ORDER — TAMSULOSIN HYDROCHLORIDE 0.4 MG/1
CAPSULE ORAL
Qty: 90 CAPSULE | Refills: 3 | OUTPATIENT
Start: 2021-04-12

## 2021-04-12 NOTE — TELEPHONE ENCOUNTER
Received refill request for tamsulosin 0.4 mg capsules  Patient was instructed to discontinue medication during LOV with [facility]   Medication does not meet refill criteria protocol  Medication denied.

## 2021-08-13 RX ORDER — TAMSULOSIN HYDROCHLORIDE 0.4 MG/1
CAPSULE ORAL
Qty: 90 CAPSULE | Refills: 3 | OUTPATIENT
Start: 2021-08-13

## 2021-08-17 RX ORDER — TAMSULOSIN HYDROCHLORIDE 0.4 MG/1
CAPSULE ORAL
Qty: 90 CAPSULE | Refills: 3 | OUTPATIENT
Start: 2021-08-17

## 2021-08-17 NOTE — TELEPHONE ENCOUNTER
Refill request of Tamsulosin not applicable at this time. Patient was last seen by Dr Missael Garcia on 8/18/20 and see office notes with instruction to discontinue Tamsulosin. Patient made aware via Mychart.

## 2021-08-24 ENCOUNTER — TELEPHONE (OUTPATIENT)
Dept: SURGERY | Facility: CLINIC | Age: 61
End: 2021-08-24

## 2021-08-24 DIAGNOSIS — R35.0 URINARY FREQUENCY: ICD-10-CM

## 2021-08-24 DIAGNOSIS — N40.1 BENIGN PROSTATIC HYPERPLASIA WITH LOWER URINARY TRACT SYMPTOMS, SYMPTOM DETAILS UNSPECIFIED: ICD-10-CM

## 2021-08-24 DIAGNOSIS — Z12.5 SCREENING PSA (PROSTATE SPECIFIC ANTIGEN): Primary | ICD-10-CM

## 2021-08-24 NOTE — TELEPHONE ENCOUNTER
Pt's request for Tamsulosin was refused by RN, Chet Rodriguez d/t no appt for over 1 yr. I tried to reach pt to inform him of below and had to LM. Pt schd the CMG last year but then cxld it.  I offered him an appt to discuss further for Tues 8/31 at 5:10 pm or he ca

## 2021-08-30 NOTE — TELEPHONE ENCOUNTER
Pt returning missed call, stating is unable to make tomorrow appt. Pt states he is available Wednesdays and Fridays or it would have to be before 10 am any other day.  Please advise

## 2021-09-02 RX ORDER — TAMSULOSIN HYDROCHLORIDE 0.4 MG/1
0.4 CAPSULE ORAL DAILY
Qty: 30 CAPSULE | Refills: 0 | Status: SHIPPED | OUTPATIENT
Start: 2021-09-02

## 2021-09-02 NOTE — TELEPHONE ENCOUNTER
This RN made a follow up call. No answer. Left message. Again, patient was last seen at office on 8/18/20. RN to 10/7 Thurs 9:20 AM or 10/8 Friday 9:30AM. RN left message to call back office to confirm.  Tamsulosin refill of 30 supply sent to pharmacy t

## 2021-09-02 NOTE — TELEPHONE ENCOUNTER
This RN called patient in response to his call:     \"Pt returning missed call, stating is unable to make tomorrow appt. Pt states he is available Wednesdays and Fridays or it would have to be before 10 am any other day. Please advise \"     No answer.  Lef

## 2021-09-03 NOTE — TELEPHONE ENCOUNTER
Third call. This RN made a follow up call. No answer. Left message. Again, patient was last seen at office on 8/18/20. RN to offer 10/8 Friday 9:30AM. RN left message to call back office to confirm.  Tamsulosin refill of 30 supply sent to pharmacy, 9/

## 2021-11-12 ENCOUNTER — OFFICE VISIT (OUTPATIENT)
Dept: FAMILY MEDICINE CLINIC | Facility: CLINIC | Age: 61
End: 2021-11-12
Payer: MEDICARE

## 2021-11-12 ENCOUNTER — TELEPHONE (OUTPATIENT)
Dept: FAMILY MEDICINE CLINIC | Facility: CLINIC | Age: 61
End: 2021-11-12

## 2021-11-12 VITALS
BODY MASS INDEX: 20.54 KG/M2 | WEIGHT: 150 LBS | DIASTOLIC BLOOD PRESSURE: 70 MMHG | HEIGHT: 71.8 IN | SYSTOLIC BLOOD PRESSURE: 112 MMHG | HEART RATE: 80 BPM

## 2021-11-12 DIAGNOSIS — J01.00 ACUTE MAXILLARY SINUSITIS, RECURRENCE NOT SPECIFIED: ICD-10-CM

## 2021-11-12 DIAGNOSIS — R05.9 COUGH: ICD-10-CM

## 2021-11-12 DIAGNOSIS — J02.9 ACUTE PHARYNGITIS, UNSPECIFIED ETIOLOGY: Primary | ICD-10-CM

## 2021-11-12 PROCEDURE — 87880 STREP A ASSAY W/OPTIC: CPT | Performed by: FAMILY MEDICINE

## 2021-11-12 PROCEDURE — 99213 OFFICE O/P EST LOW 20 MIN: CPT | Performed by: FAMILY MEDICINE

## 2021-11-12 RX ORDER — AZITHROMYCIN 250 MG/1
TABLET, FILM COATED ORAL
Qty: 6 TABLET | Refills: 0 | Status: SHIPPED | OUTPATIENT
Start: 2021-11-12 | End: 2021-11-17

## 2021-11-12 NOTE — PROGRESS NOTES
Subjective:   Patient ID: Nazanin Jiang is a 59year old male.     HPI  Patient presents with:  Sore Throat: sore throat,    Tonsillitis: swollen tonsills  Nasal Congestion  Sinus Problem: started 6 days ago, fever of 100 for 3-4 days, body was aching when (primary encounter diagnosis)  Cough  Acute maxillary sinusitis, recurrence not specified    Neg strep. Discussed throat care and start abx due to persistent symptoms and possible sinusitis.    Orders Placed This Encounter      Strep A Assay W/Optic      M

## 2021-11-12 NOTE — TELEPHONE ENCOUNTER
[facility] needs the medication called in, their e-script is currently down.  Patient is waiting at pharmacy      azithromycin 250 MG Oral Tab

## 2021-11-12 NOTE — TELEPHONE ENCOUNTER
[address] back and was informed they did get the Rx electronically already--seems they are receiving eRx's slower than usual. No further action needed. azithromycin 250 MG Oral Tab 6 tablet 0 11/12/2021 11/17/2021    Sig - Route:  Take 2 ta

## 2021-12-10 RX ORDER — TAMSULOSIN HYDROCHLORIDE 0.4 MG/1
CAPSULE ORAL
Qty: 30 CAPSULE | Refills: 0 | OUTPATIENT
Start: 2021-12-10

## 2022-03-04 ENCOUNTER — OFFICE VISIT (OUTPATIENT)
Dept: FAMILY MEDICINE CLINIC | Facility: CLINIC | Age: 62
End: 2022-03-04
Payer: MEDICARE

## 2022-03-04 ENCOUNTER — HOSPITAL ENCOUNTER (OUTPATIENT)
Dept: GENERAL RADIOLOGY | Age: 62
Discharge: HOME | End: 2022-03-04
Attending: FAMILY MEDICINE
Payer: MEDICARE

## 2022-03-04 ENCOUNTER — LAB ENCOUNTER (OUTPATIENT)
Dept: LAB | Age: 62
End: 2022-03-04
Attending: FAMILY MEDICINE
Payer: MEDICARE

## 2022-03-04 VITALS
HEART RATE: 58 BPM | BODY MASS INDEX: 20.95 KG/M2 | DIASTOLIC BLOOD PRESSURE: 60 MMHG | SYSTOLIC BLOOD PRESSURE: 100 MMHG | HEIGHT: 71.8 IN | WEIGHT: 153 LBS

## 2022-03-04 DIAGNOSIS — R35.0 URINARY FREQUENCY: ICD-10-CM

## 2022-03-04 DIAGNOSIS — F25.9 SCHIZOAFFECTIVE DISORDER, UNSPECIFIED TYPE (HCC): ICD-10-CM

## 2022-03-04 DIAGNOSIS — M25.521 RIGHT ELBOW PAIN: Primary | ICD-10-CM

## 2022-03-04 DIAGNOSIS — M25.521 RIGHT ELBOW PAIN: ICD-10-CM

## 2022-03-04 DIAGNOSIS — M77.11 LATERAL EPICONDYLITIS OF RIGHT ELBOW: ICD-10-CM

## 2022-03-04 DIAGNOSIS — Z12.5 PROSTATE CANCER SCREENING: ICD-10-CM

## 2022-03-04 DIAGNOSIS — N32.81 OAB (OVERACTIVE BLADDER): ICD-10-CM

## 2022-03-04 DIAGNOSIS — R73.01 IFG (IMPAIRED FASTING GLUCOSE): ICD-10-CM

## 2022-03-04 PROCEDURE — 99213 OFFICE O/P EST LOW 20 MIN: CPT | Performed by: FAMILY MEDICINE

## 2022-03-04 PROCEDURE — 73080 X-RAY EXAM OF ELBOW: CPT | Performed by: FAMILY MEDICINE

## 2022-03-04 NOTE — PROGRESS NOTES
Blood pressure 100/60, pulse 58, height 5' 11.8\" (1.824 m), weight 153 lb (69.4 kg). Patient presents today complaining of right elbow tenderness for the past year. Reports that he injured it about a year ago. Has had previous surgery of the right wrist.  He denies any pain in the fingers. He works a physical job. He reports that he has been exercising and stretching on his own without relief. Also complains of continuing overactive bladder symptoms with frequent urination. Objective right elbow no olecranon tenderness no tenderness at the medial epicondyle there is lateral epicondylar tenderness    Assessment #1 lateral epicondylitis #2 overactive bladder    Plan #1 counterforce brace Occupational Therapy and x-ray ordered #2 referral to urology    Follow-up for Medicare annual physical fasting blood test prior.

## 2022-03-05 ENCOUNTER — TELEPHONE (OUTPATIENT)
Dept: FAMILY MEDICINE CLINIC | Facility: CLINIC | Age: 62
End: 2022-03-05

## 2022-03-05 NOTE — TELEPHONE ENCOUNTER
Left message for patient to return phone call, see below        ----- Message from Kym Pfeiffer DO sent at 3/5/2022  8:26 AM CST -----  No fracture seen on x-ray. Follow-up with treatment regimen as we discussed.

## 2022-03-15 ENCOUNTER — TELEPHONE (OUTPATIENT)
Dept: FAMILY MEDICINE CLINIC | Facility: CLINIC | Age: 62
End: 2022-03-15

## 2022-03-15 NOTE — TELEPHONE ENCOUNTER
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   Possible need to go to [employer] or [facility] due to mental health illness, usually out of pocket cost.   Form in [facility] pending file

## 2022-03-15 NOTE — TELEPHONE ENCOUNTER
Form received by mail at [place] office for [facility] [place] to complete for [place] [employer] Placed in folder.

## 2022-03-18 ENCOUNTER — LAB ENCOUNTER (OUTPATIENT)
Dept: LAB | Age: 62
End: 2022-03-18
Attending: FAMILY MEDICINE
Payer: MEDICARE

## 2022-03-18 DIAGNOSIS — Z12.5 PROSTATE CANCER SCREENING: ICD-10-CM

## 2022-03-18 DIAGNOSIS — Z12.5 SCREENING PSA (PROSTATE SPECIFIC ANTIGEN): ICD-10-CM

## 2022-03-18 DIAGNOSIS — R73.01 IFG (IMPAIRED FASTING GLUCOSE): ICD-10-CM

## 2022-03-18 DIAGNOSIS — R35.0 URINARY FREQUENCY: ICD-10-CM

## 2022-03-18 DIAGNOSIS — N40.1 BENIGN PROSTATIC HYPERPLASIA WITH LOWER URINARY TRACT SYMPTOMS, SYMPTOM DETAILS UNSPECIFIED: ICD-10-CM

## 2022-03-18 LAB
ALBUMIN SERPL-MCNC: 3.7 G/DL (ref 3.4–5)
ALBUMIN/GLOB SERPL: 1.3 {RATIO} (ref 1–2)
ALP LIVER SERPL-CCNC: 96 U/L
ALT SERPL-CCNC: 25 U/L
ANION GAP SERPL CALC-SCNC: 2 MMOL/L (ref 0–18)
AST SERPL-CCNC: 24 U/L (ref 15–37)
BILIRUB SERPL-MCNC: 0.3 MG/DL (ref 0.1–2)
BUN BLD-MCNC: 14 MG/DL (ref 7–18)
BUN/CREAT SERPL: 15.6 (ref 10–20)
CALCIUM BLD-MCNC: 8.8 MG/DL (ref 8.5–10.1)
CHLORIDE SERPL-SCNC: 105 MMOL/L (ref 98–112)
CO2 SERPL-SCNC: 29 MMOL/L (ref 21–32)
CREAT BLD-MCNC: 0.9 MG/DL
EST. AVERAGE GLUCOSE BLD GHB EST-MCNC: 111 MG/DL (ref 68–126)
FASTING STATUS PATIENT QL REPORTED: NO
GLOBULIN PLAS-MCNC: 2.8 G/DL (ref 2.8–4.4)
GLUCOSE BLD-MCNC: 93 MG/DL (ref 70–99)
HBA1C MFR BLD: 5.5 % (ref ?–5.7)
OSMOLALITY SERPL CALC.SUM OF ELEC: 282 MOSM/KG (ref 275–295)
POTASSIUM SERPL-SCNC: 4.1 MMOL/L (ref 3.5–5.1)
PROT SERPL-MCNC: 6.5 G/DL (ref 6.4–8.2)
PSA SERPL-MCNC: 1.5 NG/ML (ref ?–4)

## 2022-03-18 PROCEDURE — 84153 ASSAY OF PSA TOTAL: CPT

## 2022-03-18 PROCEDURE — 83036 HEMOGLOBIN GLYCOSYLATED A1C: CPT

## 2022-03-18 PROCEDURE — 80053 COMPREHEN METABOLIC PANEL: CPT

## 2022-03-18 PROCEDURE — 36415 COLL VENOUS BLD VENIPUNCTURE: CPT

## 2022-03-22 ENCOUNTER — APPOINTMENT (OUTPATIENT)
Dept: PHYSICAL THERAPY | Age: 62
End: 2022-03-22
Attending: FAMILY MEDICINE
Payer: MEDICARE

## 2022-03-24 ENCOUNTER — TELEPHONE (OUTPATIENT)
Dept: PHYSICAL THERAPY | Facility: HOSPITAL | Age: 62
End: 2022-03-24

## 2022-03-24 ENCOUNTER — APPOINTMENT (OUTPATIENT)
Dept: PHYSICAL THERAPY | Age: 62
End: 2022-03-24
Attending: FAMILY MEDICINE
Payer: MEDICARE

## 2022-03-24 RX ORDER — TAMSULOSIN HYDROCHLORIDE 0.4 MG/1
CAPSULE ORAL
Qty: 30 CAPSULE | Refills: 0 | OUTPATIENT
Start: 2022-03-24

## 2022-03-29 ENCOUNTER — TELEPHONE (OUTPATIENT)
Dept: PHYSICAL THERAPY | Facility: HOSPITAL | Age: 62
End: 2022-03-29

## 2022-03-29 ENCOUNTER — APPOINTMENT (OUTPATIENT)
Dept: PHYSICAL THERAPY | Age: 62
End: 2022-03-29
Attending: FAMILY MEDICINE
Payer: MEDICARE

## 2022-03-31 ENCOUNTER — TELEPHONE (OUTPATIENT)
Dept: FAMILY MEDICINE CLINIC | Facility: CLINIC | Age: 62
End: 2022-03-31

## 2022-03-31 ENCOUNTER — APPOINTMENT (OUTPATIENT)
Dept: PHYSICAL THERAPY | Age: 62
End: 2022-03-31
Attending: FAMILY MEDICINE
Payer: MEDICARE

## 2022-04-05 ENCOUNTER — APPOINTMENT (OUTPATIENT)
Dept: PHYSICAL THERAPY | Age: 62
End: 2022-04-05
Attending: FAMILY MEDICINE
Payer: MEDICARE

## 2022-04-07 ENCOUNTER — APPOINTMENT (OUTPATIENT)
Dept: PHYSICAL THERAPY | Age: 62
End: 2022-04-07
Attending: FAMILY MEDICINE
Payer: MEDICARE

## 2022-04-19 ENCOUNTER — APPOINTMENT (OUTPATIENT)
Dept: PHYSICAL THERAPY | Age: 62
End: 2022-04-19
Attending: FAMILY MEDICINE
Payer: MEDICARE

## 2022-04-25 NOTE — PROGRESS NOTES
Blood pressure 118/69, pulse 63, height 5' 11.8\" (1.824 m), weight 156 lb (70.8 kg). Patient presents today for completion of DMV form. History of schizoaffective disorder. Denies any substance abuse. No history of syncope. No motor vehicle collisions at any time in his history. Last saw psychiatry 2 weeks ago.     Objective patient with affect somewhat blunted but appropriate    Assessment schizoaffective disorder    Plan completed form    Follow-up for Medicare physical.

## 2022-04-26 ENCOUNTER — APPOINTMENT (OUTPATIENT)
Dept: PHYSICAL THERAPY | Age: 62
End: 2022-04-26
Attending: FAMILY MEDICINE
Payer: MEDICARE

## 2023-01-09 ENCOUNTER — OFFICE VISIT (OUTPATIENT)
Dept: FAMILY MEDICINE CLINIC | Facility: CLINIC | Age: 63
End: 2023-01-09
Payer: COMMERCIAL

## 2023-01-09 VITALS
WEIGHT: 158 LBS | TEMPERATURE: 98 F | HEIGHT: 72.5 IN | SYSTOLIC BLOOD PRESSURE: 120 MMHG | HEART RATE: 71 BPM | DIASTOLIC BLOOD PRESSURE: 66 MMHG | OXYGEN SATURATION: 98 % | BODY MASS INDEX: 21.17 KG/M2

## 2023-01-09 DIAGNOSIS — N50.89 TESTICULAR MASS: Primary | ICD-10-CM

## 2023-01-09 PROCEDURE — 99212 OFFICE O/P EST SF 10 MIN: CPT | Performed by: FAMILY MEDICINE

## 2023-01-09 PROCEDURE — 3078F DIAST BP <80 MM HG: CPT | Performed by: FAMILY MEDICINE

## 2023-01-09 PROCEDURE — 3074F SYST BP LT 130 MM HG: CPT | Performed by: FAMILY MEDICINE

## 2023-01-09 PROCEDURE — 3008F BODY MASS INDEX DOCD: CPT | Performed by: FAMILY MEDICINE

## 2023-01-10 ENCOUNTER — TELEPHONE (OUTPATIENT)
Dept: FAMILY MEDICINE CLINIC | Facility: CLINIC | Age: 63
End: 2023-01-10

## 2023-01-10 NOTE — TELEPHONE ENCOUNTER
[facility] scheduling requesting to have a RN call the patient in regards to ultrasound/surgery, pt was confused.

## 2023-06-12 ENCOUNTER — MED REC SCAN ONLY (OUTPATIENT)
Dept: FAMILY MEDICINE CLINIC | Facility: CLINIC | Age: 63
End: 2023-06-12

## 2023-09-07 ENCOUNTER — OFFICE VISIT (OUTPATIENT)
Dept: FAMILY MEDICINE CLINIC | Facility: CLINIC | Age: 63
End: 2023-09-07

## 2023-09-07 VITALS
BODY MASS INDEX: 22.24 KG/M2 | HEART RATE: 80 BPM | SYSTOLIC BLOOD PRESSURE: 115 MMHG | DIASTOLIC BLOOD PRESSURE: 70 MMHG | WEIGHT: 158.88 LBS | HEIGHT: 71 IN

## 2023-09-07 DIAGNOSIS — F10.20 ALCOHOLISM (HCC): ICD-10-CM

## 2023-09-07 DIAGNOSIS — Z00.00 ENCOUNTER FOR ANNUAL HEALTH EXAMINATION: ICD-10-CM

## 2023-09-07 DIAGNOSIS — F25.9 SCHIZOAFFECTIVE DISORDER, UNSPECIFIED TYPE (HCC): ICD-10-CM

## 2023-09-07 DIAGNOSIS — Z00.00 MEDICARE ANNUAL WELLNESS VISIT, INITIAL: Primary | ICD-10-CM

## 2023-09-07 DIAGNOSIS — R73.01 IFG (IMPAIRED FASTING GLUCOSE): ICD-10-CM

## 2023-09-07 DIAGNOSIS — Z13.220 LIPID SCREENING: ICD-10-CM

## 2023-09-07 DIAGNOSIS — Z13.1 DIABETES MELLITUS SCREENING: ICD-10-CM

## 2023-09-07 DIAGNOSIS — Z12.5 PROSTATE CANCER SCREENING: ICD-10-CM

## 2023-09-24 NOTE — PROGRESS NOTES
\"I've got almost no urinary retention in my bladder. Every time I drink water I will be running to the bathroom. \"  Has been going on for 3 years  Has been getting worse  Had enuresis x 1  No incontinence        Prostate was not enlarged.   Penis normal  [facility] FOLLOW UP NOTE  --------------------------------------------------------------------------------  24 hour events/subjective:  overall stable      PAST HISTORY  --------------------------------------------------------------------------------  No significant changes to PMH, PSH, FHx, SHx, unless otherwise noted    ALLERGIES & MEDICATIONS  --------------------------------------------------------------------------------  Allergies    allopurinol (Rash (Moderate))    Intolerances      Standing Inpatient Medications  benztropine 2 milliGRAM(s) Oral daily  chlorhexidine 2% Cloths 1 Application(s) Topical <User Schedule>  fluPHENAZine 10 milliGRAM(s) Oral daily  heparin   Injectable 5000 Unit(s) SubCutaneous every 8 hours  octreotide  Injectable 150 MICROGram(s) SubCutaneous three times a day  predniSONE   Tablet 100 milliGRAM(s) Oral every 24 hours  rasburicase IVPB 3 milliGRAM(s) IV Intermittent once  sodium bicarbonate 1300 milliGRAM(s) Oral three times a day  sodium chloride 0.45% 1000 milliLiter(s) IV Continuous <Continuous>    PRN Inpatient Medications      REVIEW OF SYSTEMS  --------------------------------------------------------------------------------    All other systems were reviewed and are negative, except as noted.    VITALS/PHYSICAL EXAM  --------------------------------------------------------------------------------  T(C): 36.2 (09-24-23 @ 13:26), Max: 36.7 (09-23-23 @ 13:54)  HR: 69 (09-24-23 @ 13:26) (59 - 69)  BP: 112/59 (09-24-23 @ 13:26) (87/59 - 112/59)  RR: 18 (09-24-23 @ 13:26) (18 - 19)  SpO2: --  Wt(kg): --        Physical Exam:  	Gen: NAD  	HEENT: No JVD  	Pulm: CTA B/L  	CV: RRR              Abd soft, NT  	no edema    LABS/STUDIES  --------------------------------------------------------------------------------              8.4    3.02  >-----------<  125      [09-24-23 @ 07:58]              26.1     136  |  105  |  84  ----------------------------<  140      [09-24-23 @ 07:58]  4.9   |  19  |  2.5        Ca     9.2     [09-24-23 @ 07:58]      Mg     2.5     [09-24-23 @ 07:58]      Phos  8.1     [09-24-23 @ 07:58]    TPro  3.7  /  Alb  3.0  /  TBili  0.2  /  DBili  x   /  AST  6   /  ALT  12  /  AlkPhos  292  [09-24-23 @ 07:58]        Uric acid 11.0      [09-24-23 @ 07:58]      Creatinine Trend:  SCr 2.5 [09-24 @ 07:58]  SCr 2.9 [09-23 @ 18:07]  SCr 3.1 [09-23 @ 07:52]  SCr 3.4 [09-22 @ 22:01]  SCr 3.5 [09-22 @ 07:21]    Urinalysis - [09-24-23 @ 07:58]      Color  / Appearance  / SG  / pH       Gluc 140 / Ketone   / Bili  / Urobili        Blood  / Protein  / Leuk Est  / Nitrite       RBC  / WBC  / Hyaline  / Gran  / Sq Epi  / Non Sq Epi  / Bacteria       Lipid: chol 121, , HDL 18, LDL --      [06-22-23 @ 06:00]    HBsAg Nonreact      [09-20-23 @ 11:17]  HCV 0.02, Nonreact      [09-20-23 @ 11:17]

## 2023-10-05 ENCOUNTER — HOSPITAL ENCOUNTER (OUTPATIENT)
Dept: ULTRASOUND IMAGING | Facility: HOSPITAL | Age: 63
Discharge: HOME | End: 2023-10-05
Attending: FAMILY MEDICINE
Payer: MEDICARE

## 2023-10-05 DIAGNOSIS — N50.89 TESTICULAR MASS: ICD-10-CM

## 2023-10-06 ENCOUNTER — HOSPITAL ENCOUNTER (OUTPATIENT)
Dept: ULTRASOUND IMAGING | Facility: HOSPITAL | Age: 63
Discharge: HOME | End: 2023-10-06
Attending: FAMILY MEDICINE
Payer: MEDICARE

## 2023-10-06 PROCEDURE — 76870 US EXAM SCROTUM: CPT | Performed by: FAMILY MEDICINE

## 2023-10-06 PROCEDURE — 93975 VASCULAR STUDY: CPT | Performed by: FAMILY MEDICINE

## 2023-10-07 ENCOUNTER — TELEPHONE (OUTPATIENT)
Dept: FAMILY MEDICINE CLINIC | Facility: CLINIC | Age: 63
End: 2023-10-07

## 2023-10-07 NOTE — TELEPHONE ENCOUNTER
----- Message from Adelso Steward DO sent at 10/6/2023  1:05 PM CDT -----  Cyst seen on ultrasound that would require follow-up in 3 months follow-up testicular ultrasound ordered.   For January 2023

## 2023-10-13 ENCOUNTER — TELEPHONE (OUTPATIENT)
Dept: FAMILY MEDICINE CLINIC | Facility: CLINIC | Age: 63
End: 2023-10-13

## 2023-10-13 RX ORDER — QUETIAPINE FUMARATE 400 MG/1
800 TABLET, FILM COATED ORAL NIGHTLY
Qty: 60 TABLET | Refills: 0 | Status: SHIPPED | OUTPATIENT
Start: 2023-10-13 | End: 2023-11-12

## 2023-10-13 NOTE — TELEPHONE ENCOUNTER
Jackie pharmacy tech from [facility] called, verified patient's name/. States patient called to refill his quetiapine 400 mg, takes 2 tablets daily. They faxed over refill request yesterday. Advised per Dr Paxton Mcgill note of 2023, patient was supposed to establish with psychiatrist for further refills. Maximino Clement will check with patient to see if he has established. 2023  Assessment & Plan:      Finn Lyons is a 70year old male with a history of alcohol abuse, schizoaffective disorder previously on Seroquel that was also helping patient with sleep. Patient has not been able to get medication due to not being able to follow-up with his prior psychiatrist secondary to insurance change. Discussed with patient that I will go ahead and refill his Seroquel for 30 days and provided patient with psychiatrist referral.  Discussed with patient that based on our system it appears that the psychiatrist he was referred to is in network, however I recommend that he check with the office.   Patient to let me know if there are any issues with him getting his Seroquel or with the psychiatrist referral.

## 2023-10-13 NOTE — TELEPHONE ENCOUNTER
Provider Information    Authorizing Provider Encounter Provider   DO Aysha Contreras Francenia Debar,      Medication Detail    Medication Quantity Refills Start End   QUEtiapine 400 MG Oral Tab (Discontinued) 60 tablet 0 8/22/2023 8/22/2023   Sig:   Take 2 tablets (800 mg total) by mouth nightly.      Route:   Oral     Reason for Discontinue:   Duplicate therapy

## 2023-10-13 NOTE — TELEPHONE ENCOUNTER
Patient requesting to know why his medication is not being approved. Per notes below patient was to establish care with psychiatrist.     Patient states none of the psychiatrist either no availability and or no return phone calls. Patient is also is being turned away due to his condition and that needs see someone else. Patient just found a doctor in [facility], psychology today, and hoping this is the one. Patient was under the impression Dr. Claudette Stanton would be able to prescribe regardless. Patient requesting refill as soon as possible he only has 2 left. Would like to know if an appointment is required for further refills. Please advise.

## 2023-10-13 NOTE — TELEPHONE ENCOUNTER
I will go ahead and give pt another 1mo supply, but if not able to f/u with psych should f/u with PCP Dr. Angel Romero for further management.

## 2023-11-06 NOTE — TELEPHONE ENCOUNTER
QUEtiapine 400 MG Oral Tab, Take 2 tablets (800 mg total) by mouth nightly., Disp: 60 tablet, Rfl: 0

## 2023-11-07 ENCOUNTER — TELEPHONE (OUTPATIENT)
Dept: FAMILY MEDICINE CLINIC | Facility: CLINIC | Age: 63
End: 2023-11-07

## 2023-11-07 RX ORDER — QUETIAPINE FUMARATE 400 MG/1
800 TABLET, FILM COATED ORAL NIGHTLY
Qty: 60 TABLET | Refills: 0 | Status: SHIPPED | OUTPATIENT
Start: 2023-11-07 | End: 2023-12-07

## 2023-11-07 NOTE — TELEPHONE ENCOUNTER
Please review. Protocol failed / No Protocol. Requested Prescriptions   Pending Prescriptions Disp Refills    QUEtiapine 400 MG Oral Tab 60 tablet 0     Sig: Take 2 tablets (800 mg total) by mouth nightly.        There is no refill protocol information for this order

## 2023-12-04 ENCOUNTER — PATIENT OUTREACH (OUTPATIENT)
Dept: CASE MANAGEMENT | Age: 63
End: 2023-12-04

## 2023-12-04 NOTE — PROCEDURES
The office order for PCP removal request is Approved and finalized on December 4, 2023.     Thanks,  [facility] [employer]

## 2023-12-06 RX ORDER — QUETIAPINE FUMARATE 400 MG/1
800 TABLET, FILM COATED ORAL NIGHTLY
Qty: 180 TABLET | Refills: 0 | Status: SHIPPED | OUTPATIENT
Start: 2023-12-06 | End: 2024-03-05

## 2023-12-06 NOTE — TELEPHONE ENCOUNTER
Please review; protocol failed. Requested Prescriptions   Pending Prescriptions Disp Refills    QUEtiapine 400 MG Oral Tab 180 tablet 3     Sig: Take 2 tablets (800 mg total) by mouth nightly.        There is no refill protocol information for this order        Recent Outpatient Visits              3 months ago Medicare annual wellness visit, initial    [facility], [place] [facility], Vida Hartley, DO    Office Visit    3 months ago Schizoaffective disorder, unspecified type [facility])    Lilliana nKutson, [address] [place] [address] [address], Jose, DO    Office Visit    11 months ago Testicular mass    [facility], [facility], [place]    Office Visit    1 year ago Schizoaffective disorder, unspecified type [facility])    Fuad Barber, [facility], [place] [facility], Estelle Solomon, DO    Office Visit    1 year ago Right elbow pain    [facility], [facility], [address] [place] [address], Vida Hartley, DO    Office Visit

## 2023-12-06 NOTE — TELEPHONE ENCOUNTER
Please review refill protocol failed/ no protocol  Requested Prescriptions   Pending Prescriptions Disp Refills    QUEtiapine 400 MG Oral Tab 180 tablet 0     Sig: Take 2 tablets (800 mg total) by mouth nightly.        There is no refill protocol information for this order

## 2023-12-07 ENCOUNTER — TELEPHONE (OUTPATIENT)
Dept: FAMILY MEDICINE CLINIC | Facility: CLINIC | Age: 63
End: 2023-12-07

## 2023-12-07 NOTE — TELEPHONE ENCOUNTER
----- Message from Gisell Montes DO sent at 12/6/2023 12:37 PM CST -----  Patient to schedule follow-up testicular ultrasound ordered for next month. Order entered.

## 2023-12-15 ENCOUNTER — HOSPITAL ENCOUNTER (OUTPATIENT)
Age: 63
Discharge: HOME | End: 2023-12-15
Payer: MEDICARE

## 2023-12-15 ENCOUNTER — PATIENT MESSAGE (OUTPATIENT)
Dept: ADMINISTRATIVE | Facility: HOSPITAL | Age: 63
End: 2023-12-15

## 2023-12-15 VITALS
DIASTOLIC BLOOD PRESSURE: 69 MMHG | TEMPERATURE: 98 F | HEART RATE: 75 BPM | SYSTOLIC BLOOD PRESSURE: 113 MMHG | RESPIRATION RATE: 18 BRPM | OXYGEN SATURATION: 96 %

## 2023-12-15 DIAGNOSIS — J01.90 ACUTE SINUSITIS, RECURRENCE NOT SPECIFIED, UNSPECIFIED LOCATION: Primary | ICD-10-CM

## 2023-12-15 PROCEDURE — 99213 OFFICE O/P EST LOW 20 MIN: CPT

## 2023-12-15 PROCEDURE — 99204 OFFICE O/P NEW MOD 45 MIN: CPT

## 2023-12-15 RX ORDER — FLUTICASONE PROPIONATE 50 MCG
2 SPRAY, SUSPENSION (ML) NASAL DAILY
Qty: 16 G | Refills: 0 | Status: SHIPPED | OUTPATIENT
Start: 2023-12-15 | End: 2024-01-14

## 2023-12-15 RX ORDER — AMOXICILLIN AND CLAVULANATE POTASSIUM 875; 125 MG/1; MG/1
1 TABLET, FILM COATED ORAL 2 TIMES DAILY
Qty: 14 TABLET | Refills: 0 | Status: SHIPPED | OUTPATIENT
Start: 2023-12-15 | End: 2023-12-22

## 2023-12-15 NOTE — DISCHARGE INSTRUCTIONS
Complete entire course of antibiotic for sinus infection as directed   Use fluticasone nasal spray daily as directed     Alternate Tylenol and Motrin every 3 hours for pain or fever > 100.4 degrees  Drink plenty of fluids   Get plenty of rest     You may benefit from taking a decongestant (e.g. Sudafed)  You may benefit from taking a daily allergy medication (e.g. Zyrtec)  You may benefit from using a humidifier    Sleep with head elevated and avoid laying flat  Avoid having air blow on your face    Wash hands often  Disinfect your environment  Do not share utensils or drinks    Follow up with ENT and/or your primary care provider

## 2023-12-15 NOTE — ED INITIAL ASSESSMENT (HPI)
Patient arrived ambulatory to room c/o symptoms that started 4 weeks ago. +nasal congestion. Slight cough. No fevers. No n/v/d. Easy non labored respirations. No distress.

## 2024-02-29 RX ORDER — QUETIAPINE FUMARATE 400 MG/1
800 TABLET, FILM COATED ORAL NIGHTLY
Qty: 180 TABLET | Refills: 3 | Status: SHIPPED | OUTPATIENT
Start: 2024-02-29 | End: 2024-03-01

## 2024-02-29 NOTE — TELEPHONE ENCOUNTER
Cancel Seroquel patient to follow-up with psychiatrist.  Please contact pharmacy and check on dose

## 2024-02-29 NOTE — TELEPHONE ENCOUNTER
Please review, protocol failed/No protocol.    Requested Prescriptions   Pending Prescriptions Disp Refills    QUEtiapine 400 MG Oral Tab 180 tablet 0     Sig: Take 2 tablets (800 mg total) by mouth nightly.       There is no refill protocol information for this order          Future Appointments         Provider Department Appt Notes    In 1 week Junaid Gomez MD [facility], [facility] Cognitive issues/memory function          Recent Outpatient Visits              5 months ago Medicare annual wellness visit, initial    [facility], [facility], [place], David B, DO    Office Visit    6 months ago Schizoaffective disorder, unspecified type (HCC)    [facility], [facility], [place] Tianna Olmstead, DO    Office Visit    1 year ago Testicular mass    [facility], [facility], [place] [facility], David B, DO    Office Visit    1 year ago Schizoaffective disorder, unspecified type (HCC)    [facility], [facility], [place] Ces[place], David B, DO    Office Visit    1 year ago Right elbow pain    [facility], [place] Ces[place], David B, DO    Office Visit

## 2024-03-01 RX ORDER — QUETIAPINE FUMARATE 400 MG/1
800 TABLET, FILM COATED ORAL NIGHTLY
Qty: 180 TABLET | Refills: 0 | Status: SHIPPED | OUTPATIENT
Start: 2024-03-01

## 2024-03-01 NOTE — TELEPHONE ENCOUNTER
First Call attempt.   Left Voicemail to call back our office. Office phone number provided with office hours.     [place] message sent to patient.

## 2024-03-01 NOTE — TELEPHONE ENCOUNTER
RN communicated with Dr. Vides , to clarify message     Per Dr. Vides:   Please check dose with pharmacy.  Will refill x 1 patient to follow-up with psychiatry.     RN Called pharmacy. Patient's date of birth and full name both confirmed.   Spoke with Daniella Tilley dose verified: Quetiapine 400mg tablets, Take 2 tablets by mouth nightly.   And advised fill this one time, no refills.  New order sent electronically. And Daniella with make sure their records are updated as well: change from 3 refills to zero refills. Daniella verbalizes understanding of all information, and agreeable to plan.

## 2024-03-04 ENCOUNTER — TELEPHONE (OUTPATIENT)
Dept: FAMILY MEDICINE CLINIC | Facility: CLINIC | Age: 64
End: 2024-03-04

## 2024-03-04 ENCOUNTER — MED REC SCAN ONLY (OUTPATIENT)
Dept: FAMILY MEDICINE CLINIC | Facility: CLINIC | Age: 64
End: 2024-03-04

## 2024-03-04 NOTE — TELEPHONE ENCOUNTER
Request refill authorization form for \"Quetiapine 400 mg\" has been signed and successfully faxed to the [facility].    Fx [phone]    Order form has been submitted for scanning.

## 2024-05-16 ENCOUNTER — OFFICE VISIT (OUTPATIENT)
Dept: FAMILY MEDICINE CLINIC | Facility: CLINIC | Age: 64
End: 2024-05-16

## 2024-05-16 ENCOUNTER — EKG ENCOUNTER (OUTPATIENT)
Dept: LAB | Age: 64
End: 2024-05-16
Attending: FAMILY MEDICINE

## 2024-05-16 VITALS
HEIGHT: 71 IN | WEIGHT: 155 LBS | DIASTOLIC BLOOD PRESSURE: 70 MMHG | SYSTOLIC BLOOD PRESSURE: 117 MMHG | HEART RATE: 79 BPM | BODY MASS INDEX: 21.7 KG/M2

## 2024-05-16 DIAGNOSIS — F25.9 SCHIZOAFFECTIVE DISORDER, UNSPECIFIED TYPE (HCC): ICD-10-CM

## 2024-05-16 DIAGNOSIS — Z00.00 MEDICARE ANNUAL WELLNESS VISIT, SUBSEQUENT: Primary | ICD-10-CM

## 2024-05-16 DIAGNOSIS — Z00.00 MEDICARE ANNUAL WELLNESS VISIT, SUBSEQUENT: ICD-10-CM

## 2024-05-16 DIAGNOSIS — R94.31 ABNORMAL EKG: ICD-10-CM

## 2024-05-16 DIAGNOSIS — Z00.00 ENCOUNTER FOR ANNUAL HEALTH EXAMINATION: ICD-10-CM

## 2024-05-16 DIAGNOSIS — F10.20 ALCOHOLISM (HCC): ICD-10-CM

## 2024-05-16 DIAGNOSIS — N40.1 BENIGN PROSTATIC HYPERPLASIA WITH LOWER URINARY TRACT SYMPTOMS, SYMPTOM DETAILS UNSPECIFIED: ICD-10-CM

## 2024-05-16 DIAGNOSIS — N32.81 OAB (OVERACTIVE BLADDER): ICD-10-CM

## 2024-05-16 DIAGNOSIS — N39.44 NOCTURNAL ENURESIS: ICD-10-CM

## 2024-05-16 DIAGNOSIS — R73.01 IFG (IMPAIRED FASTING GLUCOSE): ICD-10-CM

## 2024-05-16 LAB
ATRIAL RATE: 64 BPM
P AXIS: 74 DEGREES
P-R INTERVAL: 160 MS
Q-T INTERVAL: 394 MS
QRS DURATION: 72 MS
QTC CALCULATION (BEZET): 406 MS
R AXIS: 61 DEGREES
T AXIS: 63 DEGREES
VENTRICULAR RATE: 64 BPM

## 2024-05-16 PROCEDURE — 1159F MED LIST DOCD IN RCRD: CPT | Performed by: FAMILY MEDICINE

## 2024-05-16 PROCEDURE — 3078F DIAST BP <80 MM HG: CPT | Performed by: FAMILY MEDICINE

## 2024-05-16 PROCEDURE — 1160F RVW MEDS BY RX/DR IN RCRD: CPT | Performed by: FAMILY MEDICINE

## 2024-05-16 PROCEDURE — 3074F SYST BP LT 130 MM HG: CPT | Performed by: FAMILY MEDICINE

## 2024-05-16 PROCEDURE — 93005 ELECTROCARDIOGRAM TRACING: CPT

## 2024-05-16 PROCEDURE — 93010 ELECTROCARDIOGRAM REPORT: CPT | Performed by: STUDENT IN AN ORGANIZED HEALTH CARE EDUCATION/TRAINING PROGRAM

## 2024-05-16 PROCEDURE — 96160 PT-FOCUSED HLTH RISK ASSMT: CPT | Performed by: FAMILY MEDICINE

## 2024-05-16 PROCEDURE — G0439 PPPS, SUBSEQ VISIT: HCPCS | Performed by: FAMILY MEDICINE

## 2024-05-16 PROCEDURE — 3008F BODY MASS INDEX DOCD: CPT | Performed by: FAMILY MEDICINE

## 2024-05-16 RX ORDER — FINASTERIDE 5 MG/1
5 TABLET, FILM COATED ORAL DAILY
Qty: 90 TABLET | Refills: 0 | Status: SHIPPED | OUTPATIENT
Start: 2024-05-16

## 2024-05-16 NOTE — PROGRESS NOTES
Subjective:   Wilfrid Sands is a 64 year old male who presents for a Medicare Subsequent Annual Wellness visit (Pt already had Initial Annual Wellness) and scheduled follow up of multiple significant but stable problems.       History/Other:   Fall Risk Assessment:   He has been screened for Falls and is low risk.      Cognitive Assessment:   He had a completely normal cognitive assessment - see flowsheet entries     Functional Ability/Status:   Wilfrid Sands has some abnormal functions as listed below:  He has Hearing problems based on screening of functional status.He has Vision problems based on screening of functional status. He has Walking problems based on screening of functional status. He has problems with Memory based on screening of functional status.       Depression Screening (PHQ-2/PHQ-9): PHQ-2 SCORE: 0  , done 5/16/2024   If you checked off any problems, how difficult have these problems made it for you to do your work, take care of things at home, or get along with other people?: Not difficult at all    Last [place] Suicide Screening on 5/16/2024 was No Risk.     6 minutes spent screening and counseling for depression    Advanced Directives:   He does NOT have a Living Will. [Do you have a living will?: No]  He does NOT have a Power of  for Health Care. [Do you have a healthcare power of ?: No]  Discussed Advance Care Planning with patient (and family/surrogate if present). Standard forms made available to patient in After Visit Summary.      Patient Active Problem List   Diagnosis    Hypertrophy of prostate with urinary obstruction and other lower urinary tract symptoms (LUTS)    Schizoaffective disorder (HCC)    Urinary frequency    OAB (overactive bladder)    Nocturnal enuresis    Colitis    Pulmonary nodule    Testicular lump    Medicare annual wellness visit, initial    Alcoholism (HCC)    Weight loss    Left lower quadrant pain    Abnormal CT of the abdomen    Lower respiratory  infection    Vasomotor rhinitis     Allergies:  He is allergic to lithium, olanzapine, haldol [haloperidol lactate], haloperidol, kdc:olanzapine, lithium, and valproic acid.    Current Medications:  Outpatient Medications Marked as Taking for the 5/16/24 encounter (Office Visit) with Kurt Vides DO   Medication Sig    finasteride 5 MG Oral Tab Take 1 tablet (5 mg total) by mouth daily.    QUEtiapine 400 MG Oral Tab Take 2 tablets (800 mg total) by mouth nightly.    tamsulosin 0.4 MG Oral Cap Take 1 capsule (0.4 mg total) by mouth daily. Patient states he takes 2 cap daily       Medical History:  He  has a past medical history of diseases NEC, Schizoaffective disorder (HCC) (1997), and Sebaceous cyst (2007).  Surgical History:  He  has a past surgical history that includes skin surgery (2007); skin surgery; colonoscopy; and colonoscopy (N/A, 11/19/2018).   Family History:  His family history is not on file.  Social History:  He  reports that he has quit smoking. His smoking use included cigarettes. He has quit using smokeless tobacco. He reports that he does not drink alcohol and does not use drugs.    Tobacco:  He smoked tobacco in the past but quit greater than 12 months ago.  Social History     Tobacco Use   Smoking Status Former    Types: Cigarettes   Smokeless Tobacco Former   Tobacco Comments    5 cigarettes a day        CAGE Alcohol Screen:   CAGE screening score of 0 on 5/16/2024, showing low risk of alcohol abuse.      Patient Care Team:  Kurt Vides DO as PCP - General (Family Medicine)    Review of Systems  GENERAL: feels well otherwise  SKIN: denies any unusual skin lesions  EYES: denies blurred vision or double vision  HEENT: denies nasal congestion, sinus pain or ST  LUNGS: denies shortness of breath with exertion  CARDIOVASCULAR: denies chest pain on exertion  GI: denies abdominal pain, denies heartburn  : 1 per night nocturia, no complaint of urinary incontinence  MUSCULOSKELETAL:  denies back pain  NEURO: denies headaches  PSYCHE: denies depression or anxiety  HEMATOLOGIC: denies hx of anemia  ENDOCRINE: denies thyroid history  ALL/ASTHMA: denies hx of allergy or asthma    Objective:   Physical Exam  Male genitalia: normal  /70   Pulse 79   Ht 5' 11\" (1.803 m)   Wt 155 lb (70.3 kg)   BMI 21.62 kg/m²  Estimated body mass index is 21.62 kg/m² as calculated from the following:    Height as of this encounter: 5' 11\" (1.803 m).    Weight as of this encounter: 155 lb (70.3 kg).    Medicare Hearing Assessment:   Hearing Screening    Time taken: 5/16/2024 11:51 AM  Screening Method: Whisper Test  Whisper Test Result: Pass         Visual Acuity:   Right Eye Visual Acuity: Uncorrected Right Eye Chart Acuity: 20/25   Left Eye Visual Acuity: Uncorrected Left Eye Chart Acuity: 20/25   Both Eyes Visual Acuity: Uncorrected Both Eyes Chart Acuity: 20/25   Able To Tolerate Visual Acuity: Yes        Assessment & Plan:   Wilfrid Sands is a 64 year old male who presents for a Medicare Assessment.     1. Medicare annual wellness visit, subsequent (Primary)  -     EKG 12 Lead; Future; Expected date: 05/16/2024  -     OPHTHALMOLOGY - INTERNAL  2. Schizoaffective disorder, unspecified type (HCC)  -     Behavioral Health Consultation  3. IFG (impaired fasting glucose)  4. Alcoholism (HCC)  5. OAB (overactive bladder)  -     Urology Referral - [facility])  6. Nocturnal enuresis  -     Urology Referral - [facility])  7. Benign prostatic hyperplasia with lower urinary tract symptoms, symptom details unspecified  Other orders  -     Finasteride; Take 1 tablet (5 mg total) by mouth daily.  Dispense: 90 tablet; Refill: 0    The patient indicates understanding of these issues and agrees to the plan.  Consult ordered.  Reinforced healthy diet, lifestyle, and exercise.      No follow-ups on file.     Kurt Vides DO, 5/16/2024     Supplementary Documentation:   General  Health:  In the past six months, have you lost more than 10 pounds without trying?: 1 - Yes  Has your appetite been poor?: Yes  Type of Diet: Vegetarian  How does the patient maintain a good energy level?: Daily Walks  How would you describe your daily physical activity?: Heavy  How would you describe your current health state?: Poor  How do you maintain positive mental well-being?: Visiting Family  On a scale of 0 to 10, with 0 being no pain and 10 being severe pain, what is your pain level?: 5 - (Moderate)  In the past six months, have you experienced urine leakage?: 1-Yes  At any time do you feel concerned for the safety/well-being of yourself and/or your children, in your home or elsewhere?: Yes  Have you had any immunizations at another office such as Influenza, Hepatitis B, Tetanus, or Pneumococcal?: No        Wilfrid Sands's SCREENING SCHEDULE   Tests on this list are recommended by your physician but may not be covered, or covered at this frequency, by your insurer.   Please check with your insurance carrier before scheduling to verify coverage.   PREVENTATIVE SERVICES FREQUENCY &  COVERAGE DETAILS LAST COMPLETION DATE   Diabetes Screening    Fasting Blood Sugar / Glucose    One screening every 12 months if never tested or if previously tested but not diagnosed with pre-diabetes   One screening every 6 months if diagnosed with pre-diabetes Lab Results   Component Value Date    GLU 93 03/18/2022        Cardiovascular Disease Screening    Lipid Panel  Cholesterol  Lipoprotein (HDL)  Triglycerides Covered every 5 years for all Medicare beneficiaries without apparent signs or symptoms of cardiovascular disease No results found for: \"CHOLEST\", \"HDL\", \"LDL\", \"LDLD\", \"LDLC\", \"TRIG\"      Electrocardiogram (EKG)   Covered if needed at Welcome to Medicare, and non-screening if indicated for medical reasons 06/25/2018      Ultrasound Screening for Abdominal Aortic Aneurysm (AAA) Covered once in a lifetime for one of  the following risk factors    Men who are 65-75 years old and have ever smoked    Anyone with a family history -     Colorectal Cancer Screening  Covered for ages 50-85; only need ONE of the following:    Colonoscopy   Covered every 10 years    Covered every 2 years if patient is at high risk or previous colonoscopy was abnormal 11/19/2018    Health Maintenance   Topic Date Due    Colorectal Cancer Screening  11/19/2028       Flexible Sigmoidoscopy   Covered every 4 years -    Fecal Occult Blood Test Covered annually -   Prostate Cancer Screening    Prostate-Specific Antigen (PSA) Annually Lab Results   Component Value Date    PSA 1.50 03/18/2022     Health Maintenance   Topic Date Due    PSA  03/18/2024      Immunizations    Influenza Covered once per flu season  Please get every year -  No recommendations at this time    Pneumococcal Each vaccine ([id number] & [id number]) covered once after 65 Prevnar 13: -    [id number]: -     No recommendations at this time    Hepatitis B One screening covered for patients with certain risk factors   -  No recommendations at this time    Tetanus Toxoid Not covered by Medicare Part B unless medically necessary (cut with metal); may be covered with your pharmacy prescription benefits -    Tetanus, Diptheria and Pertusis TD and TDaP Not covered by Medicare Part B -  No recommendations at this time    Zoster Not covered by Medicare Part B; may be covered with your pharmacy  prescription benefits -  Zoster Vaccines(1 of 2) Never done       1. Medicare annual wellness visit, subsequent  VACCINES AT PHARMACY  - EKG 12 Lead; Future  - OPHTHALMOLOGY - INTERNAL    2. Schizoaffective disorder, unspecified type (HCC)  DIFFICULTY GETTING INTO PSYCHIATRY   - OP REFERRAL TO [facility]    3. IFG (impaired fasting glucose)  LABS ORDERED    4. Alcoholism (HCC)  NO LONGER DRINKS     5. OAB (overactive bladder)    - Urology Referral - [place] ([place] [facility])    6. Nocturnal enuresis  ADD  FINASTERIDE   - Urology Referral - [place] ([facility])    7. Benign prostatic hyperplasia with lower urinary tract symptoms, symptom details unspecified  ABOVE

## 2024-05-16 NOTE — PATIENT INSTRUCTIONS
PHARMACY FOR [facility]   Wilfrid Sands's SCREENING SCHEDULE   Tests on this list are recommended by your physician but may not be covered, or covered at this frequency, by your insurer.   Please check with your insurance carrier before scheduling to verify coverage.   PREVENTATIVE SERVICES FREQUENCY &  COVERAGE DETAILS LAST COMPLETION DATE   Diabetes Screening    Fasting Blood Sugar / Glucose    One screening every 12 months if never tested or if previously tested but not diagnosed with pre-diabetes   One screening every 6 months if diagnosed with pre-diabetes Lab Results   Component Value Date    GLU 93 03/18/2022        Cardiovascular Disease Screening    Lipid Panel  Cholesterol  Lipoprotein (HDL)  Triglycerides Covered every 5 years for all Medicare beneficiaries without apparent signs or symptoms of cardiovascular disease No results found for: \"CHOLEST\", \"HDL\", \"LDL\", \"LDLD\", \"LDLC\", \"TRIG\"      Electrocardiogram (EKG)   Covered if needed at Welcome to Medicare, and non-screening if indicated for medical reasons 06/25/2018      Ultrasound Screening for Abdominal Aortic Aneurysm (AAA) Covered once in a lifetime for one of the following risk factors   • Men who are 65-75 years old and have ever smoked   • Anyone with a family history -     Colorectal Cancer Screening  Covered for ages 50-85; only need ONE of the following:    Colonoscopy   Covered every 10 years    Covered every 2 years if patient is at high risk or previous colonoscopy was abnormal 11/19/2018    Health Maintenance   Topic Date Due   • Colorectal Cancer Screening  11/19/2028       Flexible Sigmoidoscopy   Covered every 4 years -    Fecal Occult Blood Test Covered annually -   Prostate Cancer Screening    Prostate-Specific Antigen (PSA) Annually Lab Results   Component Value Date    PSA 1.50 03/18/2022     Health Maintenance   Topic Date Due   • PSA  03/18/2024      Immunizations    Influenza Covered once per flu season  Please get every  year -  No recommendations at this time    Pneumococcal Each vaccine ([id number] & Wmziqnslm37) covered once after 65 Prevnar 13: -    Txaveoter03: -     No recommendations at this time    Hepatitis B One screening covered for patients with certain risk factors   -  No recommendations at this time    Tetanus Toxoid Not covered by Medicare Part B unless medically necessary (cut with metal); may be covered with your pharmacy prescription benefits -    Tetanus, Diptheria and Pertusis TD and TDaP Not covered by Medicare Part B -  No recommendations at this time    Zoster Not covered by Medicare Part B; may be covered with your pharmacy  prescription benefits -  Zoster Vaccines(1 of 2) Never done

## 2024-05-17 NOTE — TELEPHONE ENCOUNTER
Called pt and pt requested for results to be sent to [facility] instead.     [employer] message sent .

## 2024-05-17 NOTE — TELEPHONE ENCOUNTER
----- Message from Kurt Vides sent at 5/16/2024  5:37 PM CDT -----  EKG abnormal cardiology follow-up recommended order entered.

## 2024-05-21 ENCOUNTER — TELEPHONE (OUTPATIENT)
Age: 64
End: 2024-05-21

## 2024-05-21 NOTE — TELEPHONE ENCOUNTER
I am reaching out from the [facility] Navigation department, following up on an order from your provider's office to assist in connecting you with resources for care. If you would like to discuss this further, please give us a call back at [phone], or for more immediate assistance you can contact our 24-hour help line at [phone]. We look forward to hearing from you soon.

## 2024-05-22 ENCOUNTER — TELEPHONE (OUTPATIENT)
Age: 64
End: 2024-05-22

## 2024-05-22 NOTE — TELEPHONE ENCOUNTER
Abiel Yuen,    I am glad that we were able to connect today. Here are some psychiatry resources that may be a good fit for you. Based on their online profiles, they are in network with your current insurance. Please verify your insurance coverage with any providers that you may choose to call and schedule with directly. If you have any other questions, please give me a call at [phone]. If you need more immediate assistance, or assistance outside of business hours, please contact the [facility] 24/7 helpline at [phone].    [facility]  [address]  [address]  [facility] [address]  Phone: [phone]  [email or web address]    [facility]  [address] [place] [address]  Phone: [phone]  [email or web address]     [facility]  [address]  [facility] [address]  Phone: [phone]  [email or web address]    [facility]  [address] [address]  Phone: [phone]  [email or web address]

## 2024-05-23 ENCOUNTER — TELEPHONE (OUTPATIENT)
Age: 64
End: 2024-05-23

## 2024-08-12 NOTE — TELEPHONE ENCOUNTER
Patient wants a refill on:    QUEtiapine 400 MG Oral Tab 180 tablet 0 3/1/2024 --   Sig:   Take 2 tablets (800 mg total) by mouth nightly.     Route:   Oral         [facility] [facility], [facility] - [place], [place] - [address] [place] [address] [phone], [phone]

## 2024-08-13 RX ORDER — QUETIAPINE FUMARATE 400 MG/1
800 TABLET, FILM COATED ORAL NIGHTLY
Qty: 180 TABLET | Refills: 3 | Status: SHIPPED | OUTPATIENT
Start: 2024-08-13

## 2024-08-13 RX ORDER — QUETIAPINE FUMARATE 400 MG/1
800 TABLET, FILM COATED ORAL NIGHTLY
Qty: 180 TABLET | Refills: 0 | OUTPATIENT
Start: 2024-08-13

## 2024-08-13 NOTE — TELEPHONE ENCOUNTER
Please review.  Protocol failed / Has no protocol.     Requested Prescriptions   Pending Prescriptions Disp Refills    QUEtiapine 400 MG Oral Tab 180 tablet 0     Sig: Take 2 tablets (800 mg total) by mouth nightly.       There is no refill protocol information for this order          Recent Outpatient Visits              2 months ago Medicare annual wellness visit, subsequent    [facility], [facility], [place] Cespedes, David B, DO    Office Visit    5 months ago     [facility], [facility] Junaid Gomez MD    Office Visit    11 months ago Medicare annual wellness visit, initial    [facility] [place] Cespedes, David B, DO    Office Visit    11 months ago Schizoaffective disorder, unspecified type (HCC)    [facility]na Olmstead, DO    Office Visit    1 year ago Testicular mass    [facility] [place]    Office Visit

## 2024-10-19 ENCOUNTER — HOSPITAL ENCOUNTER (OUTPATIENT)
Age: 64
Discharge: HOME | End: 2024-10-19
Payer: MEDICARE

## 2024-10-19 VITALS
OXYGEN SATURATION: 100 % | RESPIRATION RATE: 16 BRPM | TEMPERATURE: 97 F | SYSTOLIC BLOOD PRESSURE: 116 MMHG | DIASTOLIC BLOOD PRESSURE: 60 MMHG | HEART RATE: 70 BPM

## 2024-10-19 DIAGNOSIS — S61.209A AVULSION OF FINGERTIP, INITIAL ENCOUNTER: Primary | ICD-10-CM
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
## 2024-10-19 PROCEDURE — 12001 RPR S/N/AX/GEN/TRNK 2.5CM/<: CPT

## 2024-10-19 PROCEDURE — 99213 OFFICE O/P EST LOW 20 MIN: CPT

## 2024-10-19 RX ORDER — IBUPROFEN 600 MG/1
600 TABLET, FILM COATED ORAL ONCE
Status: COMPLETED | OUTPATIENT
Start: 2024-10-19 | End: 2024-10-19

## 2024-10-19 RX ORDER — LIDOCAINE HYDROCHLORIDE AND EPINEPHRINE 10; 10 MG/ML; UG/ML
10 INJECTION, SOLUTION INFILTRATION; PERINEURAL ONCE
Status: COMPLETED | OUTPATIENT
Start: 2024-10-19 | End: 2024-10-19
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
## 2024-10-19 NOTE — ED PROVIDER NOTES
Patient Seen in: [facility] [place]      History     Chief Complaint   Patient presents with    Laceration/Abrasion     Stated Complaint: Finger Injury    Subjective:   HPI    Patient is 64-year-old male with history below, right-hand-dominant, presenting to immediate care for evaluation of left ring finger injury.  Onset: Last night.  Work-related injury.  Patient works in .  States he accidentally cut his finger with a kitchen knife.  Sustained laceration with associated pain and bleeding.  Applied dressing.  Coming to immediate care for further evaluation.  Current bleeding bleeding and pain.  No swelling.  No redness or warmth.  No bruising.  No purulent drainage.  No numbness weakness paresthesias.  Normal range of motion of extremity.  Tetanus status is up-to-date per review of chart.  Not diabetic.  Not immunocompromise      Objective:     Past Medical History:    Hx of diseases NEC    schizoaffective disorder    Schizoaffective disorder (HCC)    DR. DWAYNE ROGERS [facility]    Sebaceous cyst    mid-back area; excision 2007              Past Surgical History:   Procedure Laterality Date    Colonoscopy      about 12+ yrs ago @ [facility]    Colonoscopy N/A 11/19/2018    Procedure: COLONOSCOPY;  Surgeon: Maldonado Guan MD;  Location: [facility] ENDOSCOPY    Skin surgery  2007    excision sebaceous cyst(s) from mid back area    Skin surgery      wrist repair right tendon repair AGE 16                No pertinent social history.            Review of Systems   Constitutional:  Positive for activity change.   Musculoskeletal:  Negative for joint swelling.   Skin:  Positive for wound.        Left ring finger injury   Allergic/Immunologic: Negative for immunocompromised state.   Neurological:  Negative for weakness and numbness.   Psychiatric/Behavioral:  Negative for confusion.    All other systems reviewed and are negative.      Positive for stated complaint: Finger Injury  Other systems are as noted in  HPI.  Constitutional and vital signs reviewed.      All other systems reviewed and negative except as noted above.    Physical Exam     ED Triage Vitals [10/19/24 1021]   /60   Pulse 70   Resp 16   Temp 97.2 °F (36.2 °C)   Temp src Temporal   SpO2 100 %   O2 Device None (Room air)       Current Vitals:   Vital Signs  BP: 116/60  Pulse: 70  Resp: 16  Temp: 97.2 °F (36.2 °C)  Temp src: Temporal    Oxygen Therapy  SpO2: 100 %  O2 Device: None (Room air)        Physical Exam  Vitals and nursing note reviewed.   Constitutional:       General: He is not in acute distress.     Appearance: Normal appearance. He is not ill-appearing, toxic-appearing or diaphoretic.   HENT:      Head: Normocephalic and atraumatic.      Mouth/Throat:      Mouth: Mucous membranes are moist.   Eyes:      Conjunctiva/sclera: Conjunctivae normal.   Cardiovascular:      Rate and Rhythm: Normal rate.      Pulses: Normal pulses.   Pulmonary:      Effort: No respiratory distress.   Musculoskeletal:         General: Tenderness present. No deformity. Normal range of motion.      Comments: Superficial finer tip avulsion less 1 cm diameter on left ring fingertip. Current bleeding. Not pulsatile. No surrounding erythema or warmth.  No purulent drainage.  No ecchymosis pain or hematoma.  Nail intact.  Normal finger range of motion.  Capillary refill less than 3 seconds   Skin:     Findings: No bruising or erythema.   Neurological:      General: No focal deficit present.      Mental Status: He is alert.      Motor: No weakness.      Coordination: Coordination normal.      Gait: Gait normal.   Psychiatric:         Mood and Affect: Mood normal.         Behavior: Behavior normal.           ED Course   Labs Reviewed - No data to display    Procedure: Hemostasis - Fingertip Avulsion with Bleeding  Time: 10:35 PM  Site: Left ring fingertip, DIP, palmar aspect, not involving nail  Foreign Body: Retained Tampon  Application of topical lidocaine with  epinephrine for analgesia and hemostasis  Sign of active bleeding application with silver nitrate application for chemical cautery/hemostasis, no active bleeding  Patient tolerated without difficulty, no complication     MDM     Dx: Left fingertip avulsion, ring finger, initial encounter  Current active bleeding  Hemostasis with lidocaine and epinephrine and silver nitrate application  No recurrent bleeding  Wound care instructions provided  NSAID therapy for pain  PCP follow-up  Follow-up occupational health for work-related injury      Medical Decision Making      Disposition and Plan     Clinical Impression:  1. Avulsion of fingertip, initial encounter    2. Work related injury         Disposition:  Discharge  10/19/2024 10:56 am    Follow-up:  Kurt Vides, DO  [address] [place] [address]  [phone]          [facility] in [address] [address]  [phone]  Schedule an appointment as soon as possible for a visit   Occupational Health for Evaluation for return to work after Work-related Injury          Medications Prescribed:  Current Discharge Medication List              Supplementary Documentation:

## 2024-10-29 ENCOUNTER — OFFICE VISIT (OUTPATIENT)
Dept: FAMILY MEDICINE CLINIC | Facility: CLINIC | Age: 64
End: 2024-10-29
Payer: COMMERCIAL

## 2024-10-29 VITALS
HEIGHT: 71 IN | BODY MASS INDEX: 22.12 KG/M2 | SYSTOLIC BLOOD PRESSURE: 131 MMHG | DIASTOLIC BLOOD PRESSURE: 69 MMHG | HEART RATE: 67 BPM | WEIGHT: 158 LBS

## 2024-10-29 DIAGNOSIS — N39.44 NOCTURNAL ENURESIS: ICD-10-CM

## 2024-10-29 DIAGNOSIS — N32.81 OAB (OVERACTIVE BLADDER): Primary | ICD-10-CM

## 2024-10-29 PROCEDURE — 1159F MED LIST DOCD IN RCRD: CPT | Performed by: FAMILY MEDICINE

## 2024-10-29 PROCEDURE — 1160F RVW MEDS BY RX/DR IN RCRD: CPT | Performed by: FAMILY MEDICINE

## 2024-10-29 PROCEDURE — 3078F DIAST BP <80 MM HG: CPT | Performed by: FAMILY MEDICINE

## 2024-10-29 PROCEDURE — 99212 OFFICE O/P EST SF 10 MIN: CPT | Performed by: FAMILY MEDICINE

## 2024-10-29 PROCEDURE — 3008F BODY MASS INDEX DOCD: CPT | Performed by: FAMILY MEDICINE

## 2024-10-29 PROCEDURE — 3075F SYST BP GE 130 - 139MM HG: CPT | Performed by: FAMILY MEDICINE

## 2024-10-29 PROCEDURE — 1126F AMNT PAIN NOTED NONE PRSNT: CPT | Performed by: FAMILY MEDICINE

## 2024-10-29 NOTE — PROGRESS NOTES
Blood pressure 131/69, pulse 67, height 5' 11\" (1.803 m), weight 158 lb (71.7 kg).      Following up for laceration of left ring fingertip.  Was treated 10 days ago.  No pain at this time.    Objective    LACERATION of the skin noted left ring fingertip 8 mm in diameter no signs of infection no surrounding erythema no discharge no tenderness    Assessment fingertip laceration healing    Plan patient advised he may return to work in 3 days.

## 2024-11-05 ENCOUNTER — TELEPHONE (OUTPATIENT)
Dept: FAMILY MEDICINE CLINIC | Facility: CLINIC | Age: 64
End: 2024-11-05

## 2024-11-18 ENCOUNTER — APPOINTMENT (OUTPATIENT)
Dept: OCCUPATIONAL MEDICINE | Age: 64
End: 2024-11-18
Attending: NURSE PRACTITIONER
Payer: MEDICARE

## 2025-01-07 ENCOUNTER — OFFICE VISIT (OUTPATIENT)
Dept: FAMILY MEDICINE CLINIC | Facility: CLINIC | Age: 65
End: 2025-01-07

## 2025-01-07 VITALS
HEART RATE: 73 BPM | SYSTOLIC BLOOD PRESSURE: 119 MMHG | HEIGHT: 71 IN | WEIGHT: 166 LBS | DIASTOLIC BLOOD PRESSURE: 66 MMHG | BODY MASS INDEX: 23.24 KG/M2

## 2025-01-07 DIAGNOSIS — R73.01 IFG (IMPAIRED FASTING GLUCOSE): ICD-10-CM

## 2025-01-07 DIAGNOSIS — Z12.5 PROSTATE CANCER SCREENING: ICD-10-CM

## 2025-01-07 DIAGNOSIS — N32.81 OAB (OVERACTIVE BLADDER): ICD-10-CM

## 2025-01-07 DIAGNOSIS — Z00.00 MEDICARE ANNUAL WELLNESS VISIT, SUBSEQUENT: Primary | ICD-10-CM

## 2025-01-07 DIAGNOSIS — K40.90 INGUINAL HERNIA, RIGHT: ICD-10-CM

## 2025-01-07 DIAGNOSIS — F25.9 SCHIZOAFFECTIVE DISORDER, UNSPECIFIED TYPE (HCC): ICD-10-CM

## 2025-01-07 DIAGNOSIS — N40.1 BENIGN PROSTATIC HYPERPLASIA WITH LOWER URINARY TRACT SYMPTOMS, SYMPTOM DETAILS UNSPECIFIED: ICD-10-CM

## 2025-01-07 NOTE — PROGRESS NOTES
Subjective:   Wlifrid Sands is a 64 year old male who presents for a MA AHA (Medicare Advantage Annual Health Assessment) and Subsequent Annual Wellness visit (Pt already had Initial Annual Wellness) and scheduled follow up of multiple significant but stable problems.       History/Other:   Fall Risk Assessment:   He has been screened for Falls and is High Risk. Fall Prevention information provided to patient in After Visit Summary.    Do you feel unsteady when standing or walking?: Yes  Do you worry about falling?: No  Have you fallen in the past year?: No     Cognitive Assessment:   Abnormal  What day of the week is this?: Correct  What month is it?: Correct  What year is it?: Correct  Recall \"Ball\": Correct  Recall \"Flag\": Incorrect  Recall \"Tree\": Correct    Functional Ability/Status:   Wilfrid Sands has some abnormal functions as listed below:  He has Vision problems based on screening of functional status. He has problems with Memory based on screening of functional status.       Depression Screening (PHQ):  PHQ-9 TOTAL SCORE: 15  , done 1/7/2025   Little interest or pleasure in doing things: 3    Feeling down, depressed, or hopeless: 3    Feeling tired or having little energy: 3    Feeling bad about yourself - or that you are a failure or have let yourself or your family down: 3    Trouble concentrating on things, such as reading the newspaper or watching television: 3    If you checked off any problems, how difficult have these problems made it for you to do your work, take care of things at home, or get along with other people?: Not difficult at all    Last [place] Suicide Screening on 1/7/2025 was No Risk.     5 minutes spent screening and counseling for depression    Advanced Directives:   He does NOT have a Living Will. [Do you have a living will?: No]  He does NOT have a Power of  for Health Care. [Do you have a healthcare power of ?: No]  Discussed Advance Care Planning with patient  (and family/surrogate if present). Standard forms made available to patient in After Visit Summary.      Patient Active Problem List   Diagnosis    Hypertrophy of prostate with urinary obstruction and other lower urinary tract symptoms (LUTS)    Schizoaffective disorder (HCC)    Urinary frequency    OAB (overactive bladder)    Nocturnal enuresis    Colitis    Pulmonary nodule    Testicular lump    Medicare annual wellness visit, initial    Alcoholism (HCC)    Weight loss    Left lower quadrant pain    Abnormal CT of the abdomen    Lower respiratory infection    Vasomotor rhinitis     Allergies:  He is allergic to lithium, olanzapine, haldol [haloperidol lactate], haloperidol, kdc:olanzapine, lithium, and valproic acid.    Current Medications:  Outpatient Medications Marked as Taking for the 1/7/25 encounter (Office Visit) with Kurt Vides DO   Medication Sig    QUEtiapine 400 MG Oral Tab Take 2 tablets (800 mg total) by mouth nightly.       Medical History:  He  has a past medical history of diseases NEC, Schizoaffective disorder (HCC) (1997), and Sebaceous cyst (2007).  Surgical History:  He  has a past surgical history that includes skin surgery (2007); skin surgery; colonoscopy; and colonoscopy (N/A, 11/19/2018).   Family History:  His family history is not on file.  Social History:  He  reports that he has quit smoking. His smoking use included cigarettes. He has quit using smokeless tobacco. He reports that he does not drink alcohol and does not use drugs.    Tobacco:  He smoked tobacco in the past but quit greater than 12 months ago.  Social History     Tobacco Use   Smoking Status Former    Types: Cigarettes   Smokeless Tobacco Former   Tobacco Comments    5 cigarettes a day        CAGE Alcohol Screen:   CAGE screening score of 0 on 1/7/2025, showing low risk of alcohol abuse.      Patient Care Team:  Kurt Vides DO as PCP - General (Family Medicine)    Review of Systems  GENERAL: feels well  otherwise  SKIN: denies any unusual skin lesions  EYES: denies blurred vision or double vision  HEENT: denies nasal congestion, sinus pain or ST  LUNGS: denies shortness of breath with exertion  CARDIOVASCULAR: denies chest pain on exertion  GI: denies abdominal pain, denies heartburn  : 1 per night nocturia, no complaint of urinary incontinence  MUSCULOSKELETAL: denies back pain  NEURO: denies headaches  PSYCHE: denies depression or anxiety  HEMATOLOGIC: denies hx of anemia  ENDOCRINE: denies thyroid history  ALL/ASTHMA: denies hx of allergy or asthma    Objective:   Physical Exam  General Appearance:  Alert, cooperative, no distress, appears stated age   Head:  Normocephalic, without obvious abnormality, atraumatic   Eyes:  PERRL, conjunctiva/corneas clear, EOM's intact, both eyes   Ears:  Normal TM's and external ear canals, both ears   Nose: Nares normal, septum midline, mucosa normal, no drainage or sinus tenderness   Throat: Lips, mucosa, and tongue normal; teeth and gums normal   Neck: Supple, symmetrical, trachea midline, no adenopathy, thyroid: not enlarged, symmetric, no tenderness/mass/nodules, no carotid bruit or JVD   Back:   Symmetric, no curvature, ROM normal, no CVA tenderness   Lungs:   Clear to auscultation bilaterally, respirations unlabored   Chest Wall:  No tenderness or deformity   Heart:  Regular rate and rhythm, S1, S2 normal, no murmur, rub or gallop   Abdomen:   Soft, non-tender, bowel sounds active all four quadrants,  no masses, no organomegaly   Genitalia: Normal male RIGHT INGUINAL HERNIA   Rectal: Normal tone, normal prostate, no masses or tenderness   Extremities: Extremities normal, atraumatic, no cyanosis or edema   Pulses: 2+ and symmetric   Skin: Skin color, texture, turgor normal, no rashes or lesions   Lymph nodes: Cervical, supraclavicular, and axillary nodes normal   Neurologic: Normal     /66 (BP Location: Left arm, Patient Position: Sitting)   Pulse 73   Ht 5' 11\"  (1.803 m)   Wt 166 lb (75.3 kg)   BMI 23.15 kg/m²  Estimated body mass index is 23.15 kg/m² as calculated from the following:    Height as of this encounter: 5' 11\" (1.803 m).    Weight as of this encounter: 166 lb (75.3 kg).    Medicare Hearing Assessment:   Hearing Screening    Time taken: 1/7/2025  2:41 PM  Entry User: Adriana Lara MA  Screening Method: Questionnaire  I have a problem hearing over the telephone: No I have trouble following the conversations when two or more people are talking at the same time: Yes   I have trouble understanding things on the TV: No I have to strain to understand conversations: No   I have to worry about missing the telephone ring or doorbell: No I have trouble hearing conversations in a noisy background such as a crowded room or restaurant: Yes   I get confused about where sounds come from: Sometimes I misunderstand some words in a sentence and need to ask people to repeat themselves: Yes   I especially have trouble understanding the speech of women and children: No I have trouble understanding the speaker in a large room such as at a meeting or place of Evangel[employer]: No   Many people I talk to seem to mumble (or don't speak clearly): No People get annoyed because I misunderstand what they say: No   I misunderstand what others are saying and make inappropriate responses: No I avoid social activities because I cannot hear well and fear I will reply improperly: No   Family members and friends have told me they think I may have hearing loss: No             Visual Acuity:   Right Eye Visual Acuity: Corrected Right Eye Chart Acuity: 20/20   Left Eye Visual Acuity: Corrected Left Eye Chart Acuity: 20/30   Both Eyes Visual Acuity: Corrected Both Eyes Chart Acuity: 20/20   Able To Tolerate Visual Acuity: Yes        Assessment & Plan:   Wilfrid Sands is a 64 year old male who presents for a Medicare Assessment.     1. Medicare annual wellness visit, subsequent (Primary)  -     CT CALCIUM  SCORING; Future; Expected date: 01/07/2025  -     OPHTHALMOLOGY - INTERNAL  -     EKG 12 Lead; Future; Expected date: 01/07/2025  2. Schizoaffective disorder, unspecified type (HCC)  -     ALT(SGPT); Future; Expected date: 01/07/2025  -     AST (SGOT); Future; Expected date: 01/07/2025  -     Basic Metabolic Panel (8); Future; Expected date: 01/07/2025  -     Lipid Panel; Future; Expected date: 01/07/2025  -     TSH W Reflex To Free T4; Future; Expected date: 01/07/2025  -     Hemoglobin A1C; Future; Expected date: 01/07/2025  3. OAB (overactive bladder)  4. IFG (impaired fasting glucose)  5. Benign prostatic hyperplasia with lower urinary tract symptoms, symptom details unspecified  6. Prostate cancer screening  7. Inguinal hernia, right  -     Surgery Referral - [facility]) - Adult & Peds    The patient indicates understanding of these issues and agrees to the plan.  Consult ordered.  Reinforced healthy diet, lifestyle, and exercise.      No follow-ups on file.     Kurt Vides DO, 1/7/2025     Supplementary Documentation:   General Health:  In the past six months, have you lost more than 10 pounds without trying?: 1 - Yes  Has your appetite been poor?: No  Type of Diet: Vegetarian  How does the patient maintain a good energy level?: Appropriate Exercise  How would you describe your daily physical activity?: Heavy  How would you describe your current health state?: Good  How do you maintain positive mental well-being?: Visiting Family;Visiting Friends  On a scale of 0 to 10, with 0 being no pain and 10 being severe pain, what is your pain level?: 0 - (None)  In the past six months, have you experienced urine leakage?: 1-Yes  At any time do you feel concerned for the safety/well-being of yourself and/or your children, in your home or elsewhere?: No  Have you had any immunizations at another office such as Influenza, Hepatitis B, Tetanus, or Pneumococcal?: No    Health Maintenance   Topic Date  Due    Zoster Vaccines (1 of 2) Never done    PSA  03/18/2024    COVID-19 Vaccine (1 - 2024-25 season) Never done    Influenza Vaccine (1) 10/01/2024    Annual Well Visit  01/01/2025    Fall Risk Screening (Annual)  01/01/2025    Colorectal Cancer Screening  11/19/2028    Annual Depression Screening  Completed    Pneumococcal Vaccine: 65+ Years  Completed     1. Medicare annual wellness visit, subsequent  VACCINES AT PHARMACY   - CT CALCIUM SCORING; Future  - OPHTHALMOLOGY - INTERNAL  - EKG 12 Lead; Future    2. Schizoaffective disorder, unspecified type (HCC)  QUETIAPINE DENIES DEPRESSION/ANXIETY  - ALT(SGPT); Future  - AST (SGOT); Future  - Basic Metabolic Panel (8); Future  - Lipid Panel; Future  - TSH W Reflex To Free T4; Future  - Hemoglobin A1C; Future    3. OAB (overactive bladder)  NO COMPLAINTS     4. IFG (impaired fasting glucose)  LABS ORDERED     5. Benign prostatic hyperplasia with lower urinary tract symptoms, symptom details unspecified  TAMSULOSIN NOT NEEDED     6. Prostate cancer screening      7. Inguinal hernia, right    - Surgery Referral - [facility]) - Adult & Peds

## 2025-03-31 RX ORDER — FLUTICASONE PROPIONATE 50 MCG
2 SPRAY, SUSPENSION (ML) NASAL DAILY
Qty: 16 ML | Refills: 0 | OUTPATIENT
Start: 2025-03-31

## 2025-04-21 NOTE — TELEPHONE ENCOUNTER
Patient called requesting a refill on the following medication:    QUEtiapine 400 MG Oral Tab     Patient states he only has a couple of pills left.   You may receive a short survey about today's visit. Your care provider team, Regine Samuels and PSR's,  would appreciate if you would take a few minutes to share your feedback to help us know what we are doing good at or what we can do to improve. We look forward to making your visit a positive experience.     If you had any lab work or xrays done today or have any performed in the future, please look for results and  recommendations from our office via the [facility] carolee.                                                                Thank you                                          Maia Goetz MD and staff

## 2025-06-11 RX ORDER — QUETIAPINE FUMARATE 400 MG/1
800 TABLET, FILM COATED ORAL NIGHTLY
Qty: 180 TABLET | Refills: 1 | Status: SHIPPED | OUTPATIENT
Start: 2025-06-11

## 2025-06-11 NOTE — TELEPHONE ENCOUNTER
Please Review. Protocol Failed; No Protocol     Requested Prescriptions   Pending Prescriptions Disp Refills    QUETIAPINE 400 MG Oral Tab [Pharmacy Med Name: QUETIAPINE FUMARATE 400 MG TAB] 180 tablet 0     Sig: Take 2 tablets (800 mg total) by mouth nightly.       There is no refill protocol information for this order

## 2025-06-30 ENCOUNTER — OFFICE VISIT (OUTPATIENT)
Dept: SURGERY | Facility: CLINIC | Age: 65
End: 2025-06-30

## 2025-06-30 VITALS — SYSTOLIC BLOOD PRESSURE: 144 MMHG | DIASTOLIC BLOOD PRESSURE: 66 MMHG

## 2025-06-30 DIAGNOSIS — N40.1 BPH WITH OBSTRUCTION/LOWER URINARY TRACT SYMPTOMS: Primary | ICD-10-CM

## 2025-06-30 DIAGNOSIS — N13.8 BPH WITH OBSTRUCTION/LOWER URINARY TRACT SYMPTOMS: Primary | ICD-10-CM

## 2025-06-30 PROCEDURE — 3078F DIAST BP <80 MM HG: CPT | Performed by: UROLOGY

## 2025-06-30 PROCEDURE — 3077F SYST BP >= 140 MM HG: CPT | Performed by: UROLOGY

## 2025-06-30 PROCEDURE — 1159F MED LIST DOCD IN RCRD: CPT | Performed by: UROLOGY

## 2025-06-30 PROCEDURE — 99214 OFFICE O/P EST MOD 30 MIN: CPT | Performed by: UROLOGY

## 2025-06-30 NOTE — PROGRESS NOTES
SUBJECTIVE:  Wilfrid Sands is a 65 year old male with a history of schizoaffective disorder who presents for a consultation at the request of, and a copy of this note will be sent to, Kurt Garcia, for evaluation of  benign prostatic hyperplasia. He states that the problem is unchanged. Symptoms include chronic history of BPH symptoms.  IPSS score 22 quality-of-life index score.  I had seen the patient August 2020 for similar symptoms.  He was also seen at [facility] urology.  Offered cystoscopy but he never followed through.  This was in 2023.  Reportedly tried tamsulosin in the past without improvement.  Denies constipation, excessive caffeine or alcohol intake.. He denies any other complaints.    Allergies: Allergies[1]    History:  Past Medical History[2]   Past Surgical History[3]   Family History[4]   Social History: Short Social Hx on File[5]         REVIEW OF SYSTEMS:  RESPIRATORY:  Negative for chest tightness, wheezing, cough, shortness of breath,  sputum production,chest pain or pleurisy.  CARDIOVASCULAR:  Negative for pain or chest discomfort, dizziness or fainting, palpitations, leg swelling, nocturia, or claudication.  GASTROINTESTINAL:  Negative for nausea, vomiting, diarrhea, constipation, heartburn or indigestion, abdominal pains, bloody or tarry stools.  GENERAL: Denies:  weight gain, weight loss, fever, night sweats, bone pain, malaise, and fatigue. Positive for:  none.  All other review of systems reviewed and otherwise negative    OBJECTIVE:  /66   He appears well, in no apparent distress.  Alert and oriented times three, pleasant and cooperative.  CARDIOVASCULAR:normal apical impulse  RESPIRATORY: no chest wall deformities or tenderness  ABDOMEN: abdomen is soft without significant tenderness, masses, organomegaly or guarding  GENITOURINARY:      Penis: no penile lesions or discharge. Meatus normal location and size.      Scrotum: normal in appearance      Right  Epididymis and Vas: both are palpably normal.      Right Testis: normal        Left Epididymis and Vas: both are palpably normal.      Left Testis: normal        Inguinal Lymph Nodes: non-palpable both      He deferred a digital prostate exam  ASSESSMENT/PLAN  Encounter Diagnosis   Name Primary?    BPH with obstruction/lower urinary tract symptoms Yes   Recommend:  - Recommended further evaluation with uroflow bladder scan for postvoid residual volume in office cystoscopy in anticipation of surgical therapy based on findings.  Urinalysis and PSA will be done today.    Orders Placed This Encounter   Procedures    Urinalysis, Routine    PSA Diagnostic [E]       Meds This Visit:  Requested Prescriptions      No prescriptions requested or ordered in this encounter       Imaging & Referrals:  None                        [1]   Allergies  Allergen Reactions    Lithium DIZZINESS    Olanzapine OTHER (SEE COMMENTS)    Haldol [Haloperidol Lactate]     Haloperidol      Other reaction(s): HALOPERIDOL LACTATE  Other reaction(s): HALOPERIDOL    Kdc:Olanzapine     Lithium     Valproic Acid    [2]   Past Medical History:   Hx of diseases NEC    schizoaffective disorder    Schizoaffective disorder (HCC)    DR. DWAYNE ROGERS [facility]    Sebaceous cyst    mid-back area; excision 2007   [3]   Past Surgical History:  Procedure Laterality Date    Colonoscopy      about 12+ yrs ago @ [facility]    Colonoscopy N/A 11/19/2018    Procedure: COLONOSCOPY;  Surgeon: Maldonado Guan MD;  Location: [facility] ENDOSCOPY    Skin surgery  2007    excision sebaceous cyst(s) from mid back area    Skin surgery      wrist repair right tendon repair AGE 16   [4] No family history on file.  [5]   Social History  Socioeconomic History    Marital status: Single   Tobacco Use    Smoking status: Former     Types: Cigarettes    Smokeless tobacco: Former    Tobacco comments:     5 cigarettes a day   Vaping Use    Vaping status: Never Used   Substance and Sexual Activity     Alcohol use: No     Alcohol/week: 0.0 standard drinks of alcohol    Drug use: No   Other Topics Concern    Caffeine Concern Yes     Comment: Tea; Soda

## 2025-07-31 ENCOUNTER — PROCEDURE (OUTPATIENT)
Dept: SURGERY | Facility: CLINIC | Age: 65
End: 2025-07-31

## 2025-07-31 DIAGNOSIS — N40.1 BPH WITH OBSTRUCTION/LOWER URINARY TRACT SYMPTOMS: Primary | ICD-10-CM

## 2025-07-31 DIAGNOSIS — N13.8 BPH WITH OBSTRUCTION/LOWER URINARY TRACT SYMPTOMS: Primary | ICD-10-CM

## 2025-07-31 PROCEDURE — 99213 OFFICE O/P EST LOW 20 MIN: CPT | Performed by: UROLOGY

## 2025-07-31 PROCEDURE — 1159F MED LIST DOCD IN RCRD: CPT | Performed by: UROLOGY

## 2025-07-31 PROCEDURE — 52000 CYSTOURETHROSCOPY: CPT | Performed by: UROLOGY

## 2025-07-31 RX ORDER — MIRABEGRON 25 MG/1
25 TABLET, FILM COATED, EXTENDED RELEASE ORAL DAILY
Qty: 90 TABLET | Refills: 6 | Status: SHIPPED | OUTPATIENT
Start: 2025-07-31

## 2025-07-31 RX ORDER — SULFAMETHOXAZOLE AND TRIMETHOPRIM 800; 160 MG/1; MG/1
1 TABLET ORAL ONCE
Status: COMPLETED | OUTPATIENT
Start: 2025-07-31 | End: 2025-07-31

## 2025-07-31 RX ADMIN — SULFAMETHOXAZOLE AND TRIMETHOPRIM 1 TABLET: 800; 160 TABLET ORAL at 10:44:00

## (undated) DEVICE — Device: Brand: DEFENDO AIR/WATER/SUCTION AND BIOPSY VALVE

## (undated) DEVICE — FORCEP RADIAL JAW 4

## (undated) DEVICE — ENDOSCOPY PACK - LOWER: Brand: MEDLINE INDUSTRIES, INC.

## (undated) NOTE — MR AVS SNAPSHOT
[facility]  [address] [place] [address], [address] [place], [place] [address]umu [place]               Thank you for choosing us for your health care visit with Javed Walters MD.  We are glad to serve you and happy to provide you with this summary of acquired. Please contact the Patient Business Office at [phone] if you have any questions related to insurance coverage. Thank you.          Reason for Today's Visit     Schizophrenia           Medical Issues Discussed Today     History of traumatic

## (undated) NOTE — MR AVS SNAPSHOT
[facility]  [address] [place] [address], [address] [facility]   Thera Jaiden               Thank you for choosing us for your health care visit with Jacinto Meza MD.  We are glad to serve you and happy to provide you with this summary of

## (undated) NOTE — LETTER
April 3, 2017     [address]      Dear Leopoldo Ochoa:    Below are the results from your recent visit:    Resulted Orders   XR SKULL 3 VIEWS OR LESS (CPT=70250)    Narrative    PROCEDURE: XR SKULL (<4 VIEWS) (CPT=70250)

## (undated) NOTE — Clinical Note
2/16/2017              [address] [place] [id number]         To Whom It May Concern,      Tova Schroeder was seen in my office today. He is concerned about the possible adverse effects of the Seroquel.  He feels that he i

## (undated) NOTE — LETTER
Date & Time: 10/19/2024, 11:17 AM  Patient: Wilfrid Sands  Encounter Provider(s):    Kurt August PA       To Whom It May Concern:    Wilfrid Sands was seen and treated in our department on 10/19/2024. He should not return to work until further instructed by occupational health .    If you have any questions or concerns, please do not hesitate to call.      EMILEE PETERSON  _____________________________  Physician/APC Signature

## (undated) NOTE — LETTER
6/28/2018              Emma Flynn        [address]         Dear Fco Griffin,    This letter is to inform you that our office has made several attempts to reach you by phone without success.   We were attempting to contact you b

## (undated) NOTE — ED AVS SNAPSHOT
Mr. Merrill Hernandes   MRN: [id number]    Department:  [facility] Emergency Department   Date of Visit:  6/22/2018           Disclosure     Insurance plans vary and the physician(s) referred by the ER may not be covered by your plan.  Please contac within the next three months to obtain basic health screening including reassessment of your blood pressure.     IF THERE IS ANY CHANGE OR WORSENING OF YOUR CONDITION, CALL YOUR PRIMARY CARE PHYSICIAN AT ONCE OR RETURN IMMEDIATELY TO THE EMERGENCY DEPARTMEN

## (undated) NOTE — MR AVS SNAPSHOT
[address] [address]  [phone]               Thank you for choosing us for your health care visit with Tre Stevens MD.  We are glad to serve you and happy to provide you with this summar results found for: LDL, LDLC, CHOLEST, TRIG     EKG - covered if needed at [employer], and non-screening if indicated for medical reasons    Electrocardiogram date Routine EKG is not a screening covered service except at the [employer] Vi Covered Once after 65 No orders found for this or any previous visit. Please get once after your 65th birthday    Hepatitis B for Moderate/High Risk No orders found for this or any previous visit.  Medium/high risk factors:   End-stage renal disease   Hemop 136/82 mmHg 86 97.8 °F (36.6 °C) (Oral) 6' 1\" (1.854 m) 171 lb 3.2 oz (77.656 kg) 22.59 kg/m2         Current Medications          This list is accurate as of: 1/16/17 11:02 AM.  Always use your most recent med list.                ARIPiprazole 2 MG Tabs [email or web address]

## (undated) NOTE — ED AVS SNAPSHOT
Mr. Vines [facility]   MRN: [id number]    Department:  [facility] Emergency Department   Date of Visit:  7/28/2017           Disclosure     Insurance plans vary and the physician(s) referred by the ER may not be covered by your plan.  Please contac CARE PHYSICIAN AT ONCE OR RETURN IMMEDIATELY TO THE EMERGENCY DEPARTMENT. If you have been prescribed any medication(s), please fill your prescription right away and begin taking the medication(s) as directed.   If you believe that any of the medications

## (undated) NOTE — LETTER
Date & Time: 12/15/2023, 9:22 AM  Patient: Tristan Race  Encounter Provider(s):    Babita Malone PA-C       To Whom It May Concern:    Andrews Lua was seen and treated in our department on 12/15/2023. He can return to work 12/19/2023.     If you have any questions or concerns, please do not hesitate to call.        _____________________________  Physician/APC Signature

## (undated) NOTE — LETTER
Date & Time: 8/18/2020, 11:31 AM  Patient: Rowan Stone      To Whom It May Concern:    Joellen Franklin is a patient of mine. Patient is currently being seen and treated for a prostate issue. Patient was seen in office 8/18/2020.     If you have any question

## (undated) NOTE — LETTER
4/16/2019              Kate Quivers        [address]         Dear Tsyon Gavin,    This letter is to inform you that our office has made several attempts to reach you by phone without success.   We were attempting to contact you b